# Patient Record
Sex: MALE | Race: BLACK OR AFRICAN AMERICAN | NOT HISPANIC OR LATINO | Employment: UNEMPLOYED | RURAL
[De-identification: names, ages, dates, MRNs, and addresses within clinical notes are randomized per-mention and may not be internally consistent; named-entity substitution may affect disease eponyms.]

---

## 2020-10-12 ENCOUNTER — HISTORICAL (OUTPATIENT)
Dept: ADMINISTRATIVE | Facility: HOSPITAL | Age: 40
End: 2020-10-12

## 2020-10-12 LAB
ANION GAP SERPL CALCULATED.3IONS-SCNC: 14 MMOL/L
APTT PPP: 30.7 SECONDS (ref 25.2–37.3)
BASOPHILS # BLD AUTO: 0.03 X10E3/UL (ref 0–0.2)
BASOPHILS NFR BLD AUTO: 0.4 % (ref 0–1)
BUN SERPL-MCNC: 15 MG/DL (ref 7–18)
CALCIUM SERPL-MCNC: 9.4 MG/DL (ref 8.5–10.1)
CHLORIDE SERPL-SCNC: 101 MMOL/L (ref 98–107)
CK MB SERPL-MCNC: 4.4 NG/ML (ref 1–3.6)
CO2 SERPL-SCNC: 27 MMOL/L (ref 21–32)
CREAT SERPL-MCNC: 1.06 MG/DL (ref 0.7–1.3)
EOSINOPHIL # BLD AUTO: 0.11 X10E3/UL (ref 0–0.5)
EOSINOPHIL NFR BLD AUTO: 1.5 % (ref 1–4)
ERYTHROCYTE [DISTWIDTH] IN BLOOD BY AUTOMATED COUNT: 13 % (ref 11.5–14.5)
GLUCOSE SERPL-MCNC: 99 MG/DL (ref 74–106)
HCT VFR BLD AUTO: 46.9 % (ref 40–54)
HGB BLD-MCNC: 15.9 G/DL (ref 13.5–18)
IMM GRANULOCYTES # BLD AUTO: 0.04 X10E3/UL (ref 0–0.04)
IMM GRANULOCYTES NFR BLD: 0.5 % (ref 0–0.4)
INR BLD: 1.04 (ref 0–3.3)
LYMPHOCYTES # BLD AUTO: 2.24 X10E3/UL (ref 1–4.8)
LYMPHOCYTES NFR BLD AUTO: 29.9 % (ref 27–41)
MAGNESIUM SERPL-MCNC: 2.1 MG/DL (ref 1.7–2.3)
MCH RBC QN AUTO: 28.5 PG (ref 27–31)
MCHC RBC AUTO-ENTMCNC: 33.9 G/DL (ref 32–36)
MCV RBC AUTO: 84.1 FL (ref 80–96)
MONOCYTES # BLD AUTO: 0.56 X10E3/UL (ref 0–0.8)
MONOCYTES NFR BLD AUTO: 7.5 % (ref 2–6)
MPC BLD CALC-MCNC: 9.9 FL (ref 9.4–12.4)
MYOGLOBIN SERPL-MCNC: 165 NG/ML (ref 16–116)
NEUTROPHILS # BLD AUTO: 4.52 X10E3/UL (ref 1.8–7.7)
NEUTROPHILS NFR BLD AUTO: 60.2 % (ref 53–65)
NRBC # BLD AUTO: 0 X10E3/UL (ref 0–0)
NRBC, AUTO (.00): 0 /100 (ref 0–0)
PLATELET # BLD AUTO: 297 X10E3/UL (ref 150–400)
POTASSIUM SERPL-SCNC: 4.2 MMOL/L (ref 3.5–5.1)
PROTHROMBIN TIME: 13.1 SECONDS (ref 11.7–14.7)
RBC # BLD AUTO: 5.58 X10E6/UL (ref 4.6–6.2)
SODIUM SERPL-SCNC: 138 MMOL/L (ref 136–145)
TROPONIN I SERPL-MCNC: 0.17 NG/ML (ref 0–0.06)
WBC # BLD AUTO: 7.5 X10E3/UL (ref 4.5–11)

## 2021-08-02 PROBLEM — R73.03 PREDIABETES: Status: ACTIVE | Noted: 2021-02-03

## 2021-08-31 ENCOUNTER — OFFICE VISIT (OUTPATIENT)
Dept: CARDIOLOGY | Facility: CLINIC | Age: 41
End: 2021-08-31
Payer: COMMERCIAL

## 2021-08-31 VITALS
DIASTOLIC BLOOD PRESSURE: 80 MMHG | HEIGHT: 69 IN | WEIGHT: 217 LBS | HEART RATE: 88 BPM | RESPIRATION RATE: 18 BRPM | BODY MASS INDEX: 32.14 KG/M2 | SYSTOLIC BLOOD PRESSURE: 115 MMHG

## 2021-08-31 DIAGNOSIS — I25.10 ATHEROSCLEROSIS OF NATIVE CORONARY ARTERY OF NATIVE HEART WITHOUT ANGINA PECTORIS: ICD-10-CM

## 2021-08-31 DIAGNOSIS — E78.00 HYPERCHOLESTEREMIA: ICD-10-CM

## 2021-08-31 PROCEDURE — 3074F PR MOST RECENT SYSTOLIC BLOOD PRESSURE < 130 MM HG: ICD-10-PCS | Mod: ,,, | Performed by: STUDENT IN AN ORGANIZED HEALTH CARE EDUCATION/TRAINING PROGRAM

## 2021-08-31 PROCEDURE — 1159F MED LIST DOCD IN RCRD: CPT | Mod: ,,, | Performed by: STUDENT IN AN ORGANIZED HEALTH CARE EDUCATION/TRAINING PROGRAM

## 2021-08-31 PROCEDURE — 99214 OFFICE O/P EST MOD 30 MIN: CPT | Mod: S$PBB,,, | Performed by: STUDENT IN AN ORGANIZED HEALTH CARE EDUCATION/TRAINING PROGRAM

## 2021-08-31 PROCEDURE — 3008F PR BODY MASS INDEX (BMI) DOCUMENTED: ICD-10-PCS | Mod: ,,, | Performed by: STUDENT IN AN ORGANIZED HEALTH CARE EDUCATION/TRAINING PROGRAM

## 2021-08-31 PROCEDURE — 3074F SYST BP LT 130 MM HG: CPT | Mod: ,,, | Performed by: STUDENT IN AN ORGANIZED HEALTH CARE EDUCATION/TRAINING PROGRAM

## 2021-08-31 PROCEDURE — 1159F PR MEDICATION LIST DOCUMENTED IN MEDICAL RECORD: ICD-10-PCS | Mod: ,,, | Performed by: STUDENT IN AN ORGANIZED HEALTH CARE EDUCATION/TRAINING PROGRAM

## 2021-08-31 PROCEDURE — 99214 PR OFFICE/OUTPT VISIT, EST, LEVL IV, 30-39 MIN: ICD-10-PCS | Mod: S$PBB,,, | Performed by: STUDENT IN AN ORGANIZED HEALTH CARE EDUCATION/TRAINING PROGRAM

## 2021-08-31 PROCEDURE — 3079F DIAST BP 80-89 MM HG: CPT | Mod: ,,, | Performed by: STUDENT IN AN ORGANIZED HEALTH CARE EDUCATION/TRAINING PROGRAM

## 2021-08-31 PROCEDURE — 3079F PR MOST RECENT DIASTOLIC BLOOD PRESSURE 80-89 MM HG: ICD-10-PCS | Mod: ,,, | Performed by: STUDENT IN AN ORGANIZED HEALTH CARE EDUCATION/TRAINING PROGRAM

## 2021-08-31 PROCEDURE — 3008F BODY MASS INDEX DOCD: CPT | Mod: ,,, | Performed by: STUDENT IN AN ORGANIZED HEALTH CARE EDUCATION/TRAINING PROGRAM

## 2021-08-31 PROCEDURE — 99213 OFFICE O/P EST LOW 20 MIN: CPT | Mod: PBBFAC | Performed by: STUDENT IN AN ORGANIZED HEALTH CARE EDUCATION/TRAINING PROGRAM

## 2021-08-31 RX ORDER — METOPROLOL TARTRATE 50 MG/1
TABLET ORAL
COMMUNITY
Start: 2021-08-26 | End: 2021-08-31 | Stop reason: SDUPTHER

## 2021-08-31 RX ORDER — MULTIVIT-MIN/IRON/FOLIC ACID/K 18-600-40
CAPSULE ORAL
COMMUNITY

## 2021-08-31 RX ORDER — BUPROPION HYDROCHLORIDE 300 MG/1
TABLET ORAL
COMMUNITY
Start: 2021-08-26 | End: 2021-08-31

## 2021-08-31 RX ORDER — ATORVASTATIN CALCIUM 40 MG/1
TABLET, FILM COATED ORAL
COMMUNITY
Start: 2021-08-26 | End: 2022-02-21

## 2022-04-20 ENCOUNTER — CLINICAL SUPPORT (OUTPATIENT)
Dept: PRIMARY CARE CLINIC | Facility: CLINIC | Age: 42
End: 2022-04-20

## 2022-04-20 DIAGNOSIS — Z02.89 ENCOUNTER FOR PHYSICAL EXAMINATION RELATED TO EMPLOYMENT: Primary | ICD-10-CM

## 2022-04-20 DIAGNOSIS — Z11.1 SCREENING-PULMONARY TB: ICD-10-CM

## 2022-04-20 LAB — RUBV IGG SER-ACNC: 393.9 IU/ML

## 2022-04-20 PROCEDURE — 99000 PR URINE DRUG SCREEN COLLECTION: ICD-10-PCS | Mod: ,,, | Performed by: NURSE PRACTITIONER

## 2022-04-20 PROCEDURE — 36415 COLL VENOUS BLD VENIPUNCTURE: CPT | Mod: ,,, | Performed by: NURSE PRACTITIONER

## 2022-04-20 PROCEDURE — 99499 UNLISTED E&M SERVICE: CPT | Mod: ,,, | Performed by: NURSE PRACTITIONER

## 2022-04-20 PROCEDURE — 86580 TB INTRADERMAL TEST: CPT | Mod: ,,, | Performed by: NURSE PRACTITIONER

## 2022-04-20 PROCEDURE — 86580 PR  TB INTRADERMAL TEST: ICD-10-PCS | Mod: ,,, | Performed by: NURSE PRACTITIONER

## 2022-04-20 PROCEDURE — 99000 SPECIMEN HANDLING OFFICE-LAB: CPT | Mod: ,,, | Performed by: NURSE PRACTITIONER

## 2022-04-20 PROCEDURE — 90471 IMMUNIZATION ADMIN: CPT | Mod: ,,, | Performed by: NURSE PRACTITIONER

## 2022-04-20 PROCEDURE — 90715 PR TDAP VACCINE >7 YO, IM: ICD-10-PCS | Mod: ,,, | Performed by: NURSE PRACTITIONER

## 2022-04-20 PROCEDURE — 99499 PR PHYSICAL - BASIC NON DOT/CDL: ICD-10-PCS | Mod: ,,, | Performed by: NURSE PRACTITIONER

## 2022-04-20 PROCEDURE — 36415 PR COLLECTION VENOUS BLOOD,VENIPUNCTURE: ICD-10-PCS | Mod: ,,, | Performed by: NURSE PRACTITIONER

## 2022-04-20 PROCEDURE — 90471 PR IMMUNIZ ADMIN,1 SINGLE/COMB VAC/TOXOID: ICD-10-PCS | Mod: ,,, | Performed by: NURSE PRACTITIONER

## 2022-04-20 PROCEDURE — 86706 HEP B SURFACE ANTIBODY: CPT | Mod: 90 | Performed by: NURSE PRACTITIONER

## 2022-04-20 PROCEDURE — 90715 TDAP VACCINE 7 YRS/> IM: CPT | Mod: ,,, | Performed by: NURSE PRACTITIONER

## 2022-04-20 NOTE — PROGRESS NOTES
Subjective:       Patient ID: Nieves Moody is a 41 y.o. male.    Chief Complaint: No chief complaint on file.    HPI  Review of Systems      Objective:      Physical Exam    Assessment:       Problem List Items Addressed This Visit    None     Visit Diagnoses     Encounter for physical examination related to employment    -  Primary    Relevant Orders    Hepatitis B Surface Antibody, Qual/Quant    Mumps, IgG Screen    Rubeola antibody IgG    Rubella antibody, IgG    Varicella Zoster Antibody, IgG    Tdap Vaccine (Completed)    Screening-pulmonary TB        Relevant Orders    POCT TB Skin Test (Completed)          Plan:       See scanned documents in .

## 2022-04-22 LAB
HBV SURFACE AB SER QL IA: POSITIVE
HBV SURFACE AB SERPL IA-ACNC: 1000 MIU/ML
TB INDURATION - 48 HR READ: NORMAL
TB INDURATION - 72 HR READ: NORMAL
TB SKIN TEST - 48 HR READ: NORMAL
TB SKIN TEST - 72 HR READ: NORMAL

## 2022-04-27 LAB
MEASLES IGG INDEX: >4.5
MEV IGG SER QL: POSITIVE
MUMPS AB IGG INDEX: >4.2
MUV IGG SER QL IA: POSITIVE
VARICELLA ZOSTER, BLOOD: POSITIVE
VZV IGG INDEX: 2.47 (ref 0–0.9)

## 2023-03-02 ENCOUNTER — OFFICE VISIT (OUTPATIENT)
Dept: FAMILY MEDICINE | Facility: CLINIC | Age: 43
End: 2023-03-02
Payer: COMMERCIAL

## 2023-03-02 VITALS
SYSTOLIC BLOOD PRESSURE: 124 MMHG | OXYGEN SATURATION: 98 % | BODY MASS INDEX: 33.5 KG/M2 | RESPIRATION RATE: 18 BRPM | DIASTOLIC BLOOD PRESSURE: 81 MMHG | HEART RATE: 78 BPM | TEMPERATURE: 98 F | WEIGHT: 226.19 LBS | HEIGHT: 69 IN

## 2023-03-02 DIAGNOSIS — M53.86 SCIATICA ASSOCIATED WITH DISORDER OF LUMBAR SPINE: Primary | ICD-10-CM

## 2023-03-02 PROCEDURE — 1160F RVW MEDS BY RX/DR IN RCRD: CPT | Mod: ,,, | Performed by: NURSE PRACTITIONER

## 2023-03-02 PROCEDURE — 1159F PR MEDICATION LIST DOCUMENTED IN MEDICAL RECORD: ICD-10-PCS | Mod: ,,, | Performed by: NURSE PRACTITIONER

## 2023-03-02 PROCEDURE — 3074F SYST BP LT 130 MM HG: CPT | Mod: ,,, | Performed by: NURSE PRACTITIONER

## 2023-03-02 PROCEDURE — 3079F PR MOST RECENT DIASTOLIC BLOOD PRESSURE 80-89 MM HG: ICD-10-PCS | Mod: ,,, | Performed by: NURSE PRACTITIONER

## 2023-03-02 PROCEDURE — 1160F PR REVIEW ALL MEDS BY PRESCRIBER/CLIN PHARMACIST DOCUMENTED: ICD-10-PCS | Mod: ,,, | Performed by: NURSE PRACTITIONER

## 2023-03-02 PROCEDURE — 96372 PR INJECTION,THERAP/PROPH/DIAG2ST, IM OR SUBCUT: ICD-10-PCS | Mod: ,,, | Performed by: NURSE PRACTITIONER

## 2023-03-02 PROCEDURE — 99203 PR OFFICE/OUTPT VISIT, NEW, LEVL III, 30-44 MIN: ICD-10-PCS | Mod: 25,,, | Performed by: NURSE PRACTITIONER

## 2023-03-02 PROCEDURE — 3079F DIAST BP 80-89 MM HG: CPT | Mod: ,,, | Performed by: NURSE PRACTITIONER

## 2023-03-02 PROCEDURE — 3008F BODY MASS INDEX DOCD: CPT | Mod: ,,, | Performed by: NURSE PRACTITIONER

## 2023-03-02 PROCEDURE — 96372 THER/PROPH/DIAG INJ SC/IM: CPT | Mod: ,,, | Performed by: NURSE PRACTITIONER

## 2023-03-02 PROCEDURE — 3008F PR BODY MASS INDEX (BMI) DOCUMENTED: ICD-10-PCS | Mod: ,,, | Performed by: NURSE PRACTITIONER

## 2023-03-02 PROCEDURE — 99203 OFFICE O/P NEW LOW 30 MIN: CPT | Mod: 25,,, | Performed by: NURSE PRACTITIONER

## 2023-03-02 PROCEDURE — 1159F MED LIST DOCD IN RCRD: CPT | Mod: ,,, | Performed by: NURSE PRACTITIONER

## 2023-03-02 PROCEDURE — 3074F PR MOST RECENT SYSTOLIC BLOOD PRESSURE < 130 MM HG: ICD-10-PCS | Mod: ,,, | Performed by: NURSE PRACTITIONER

## 2023-03-02 RX ORDER — KETOROLAC TROMETHAMINE 30 MG/ML
30 INJECTION, SOLUTION INTRAMUSCULAR; INTRAVENOUS
Status: COMPLETED | OUTPATIENT
Start: 2023-03-02 | End: 2023-03-02

## 2023-03-02 RX ORDER — METHOCARBAMOL 750 MG/1
TABLET, FILM COATED ORAL
COMMUNITY
Start: 2023-02-27 | End: 2023-03-06

## 2023-03-02 RX ORDER — KETOROLAC TROMETHAMINE 30 MG/ML
30 INJECTION, SOLUTION INTRAMUSCULAR; INTRAVENOUS
Status: DISCONTINUED | OUTPATIENT
Start: 2023-03-02 | End: 2023-03-02

## 2023-03-02 RX ORDER — ATORVASTATIN CALCIUM 40 MG/1
TABLET, FILM COATED ORAL
COMMUNITY
Start: 2022-09-22 | End: 2023-03-20

## 2023-03-02 RX ORDER — PREDNISONE 20 MG/1
TABLET ORAL
Qty: 23 TABLET | Refills: 0 | Status: SHIPPED | OUTPATIENT
Start: 2023-03-02 | End: 2023-03-15

## 2023-03-02 RX ORDER — PREDNISONE 20 MG/1
20 TABLET ORAL
COMMUNITY
Start: 2023-02-27 | End: 2023-03-02

## 2023-03-02 RX ORDER — GABAPENTIN 100 MG/1
CAPSULE ORAL
COMMUNITY
Start: 2023-03-01

## 2023-03-02 RX ADMIN — KETOROLAC TROMETHAMINE 30 MG: 30 INJECTION, SOLUTION INTRAMUSCULAR; INTRAVENOUS at 10:03

## 2023-03-02 NOTE — PROGRESS NOTES
RAJAT Nobles   [facility]      PATIENT NAME: Nieves Moody  : 1980  DATE: 3/2/23  MRN: [id number]      Patient PCP Information       Provider PCP Type    Linda Vazquez MD General            Reason for Visit / Chief Complaint: Pain (Room 1///)           History of Present Illness / Problem Focused Workflow     Nieves Moody presents to the clinic with Pain (Room 1///)     HPI  Nieves Moody presents to clinic with concern for lower back pain and sciatica. Worse on left.   Patient was seen at urgent care clinic several days ago prescribed low dose prednisone and given steroid shot. Patient has also taken muscle relaxer and gabapentin at home with no improvement.   Gait abnormality and impaired ROM secondary to pain. Patient reports improved pain with knee to chest raise when lying flat on back. Worse when transferring from lying to standing position.   Patient reports intermittent issues with back pain in last several months. Does exercise regularly and lift weights. No prior imaging. Did reports some immediate improvement after prev steroid injection however later wore off.   Prior hx of CABG single vessel, graft from right lower ext, prediabetes and hyperlipidemia      Review of Systems     Review of Systems   Constitutional:  Negative for activity change, appetite change, chills, diaphoresis, fatigue, fever and unexpected weight change.   HENT:  Negative for congestion, ear pain, facial swelling, hearing loss, nosebleeds and sore throat.    Eyes: Negative.    Respiratory:  Negative for apnea, cough, shortness of breath and wheezing.    Cardiovascular:  Negative for chest pain, palpitations and leg swelling.   Gastrointestinal:  Negative for abdominal distention, abdominal pain, blood in stool, constipation, diarrhea and nausea.   Endocrine: Negative for cold intolerance, heat intolerance, polydipsia, polyphagia and polyuria.   Genitourinary:  Negative for decreased urine volume,  difficulty urinating, dysuria, flank pain, frequency, hematuria and urgency.   Musculoskeletal:  Positive for back pain. Negative for arthralgias, joint swelling and myalgias.   Skin:  Negative for color change and rash.   Allergic/Immunologic: Negative.    Neurological:  Negative for dizziness, tremors, seizures, syncope, facial asymmetry, speech difficulty, weakness, light-headedness, numbness and headaches.   Hematological:  Negative for adenopathy. Does not bruise/bleed easily.   Psychiatric/Behavioral:  Negative for behavioral problems and confusion.      Medical / Social / Family History     Past Medical History:   Diagnosis Date    CAD (coronary atherosclerotic disease) 10/12/2020    Hypercholesteremia     Hyperlipidemia     Nicotine dependence, cigarettes, uncomplicated     Prediabetes 02/03/2021    A1c 6.0% 2/3/21 w/o prior hx of predm/dm    Presence of aortocoronary bypass graft     STEMI (ST elevation myocardial infarction) 10/12/2020    tsf from [facility] to [facility] for CABG (SVG to LAD) on 10/13/20 per Dr. Montejo       Past Surgical History:   Procedure Laterality Date    CORONARY ARTERY BYPASS GRAFT  10/13/2020    per Dr. Montejo at [facility]       Social History    reports that he has been smoking cigarettes. He has been smoking an average of 1 pack per day. He has never used smokeless tobacco. He reports current alcohol use. He reports that he does not use drugs.    Family History  's family history includes Diabetes in his mother.    Medications and Allergies     Medications  Outpatient Medications Marked as Taking for the 3/2/23 encounter (Office Visit) with RAJAT Nobles   Medication Sig Dispense Refill    ascorbic acid, vitamin C, 500 mg Cap       atorvastatin (LIPITOR) 40 MG tablet take 2 tablets by oral route  every hs      buPROPion (WELLBUTRIN SR) 150 MG TBSR 12 hr tablet Take 150 mg by mouth 2 (two) times daily.      clopidogreL (PLAVIX) 75 mg tablet Take 75 mg by mouth once daily.    "   gabapentin (NEURONTIN) 100 MG capsule Take by mouth.      methocarbamoL (ROBAXIN) 750 MG Tab Take by mouth.      metoprolol succinate (TOPROL-XL) 50 MG 24 hr tablet TAKE 1 TABLET BY MOUTH EVERY DAY 90 tablet 1    [DISCONTINUED] predniSONE (DELTASONE) 20 MG tablet Take 20 mg by mouth.       Current Facility-Administered Medications for the 3/2/23 encounter (Office Visit) with RAJAT Nobles   Medication Dose Route Frequency Provider Last Rate Last Admin    [COMPLETED] ketorolac injection 30 mg  30 mg Intramuscular 1 time in Clinic/HOD RAJAT Nobles   30 mg at 03/02/23 1035    [DISCONTINUED] ketorolac injection 30 mg  30 mg Intramuscular 1 time in Clinic/HOD RAJAT Nobles           Allergies  Review of patient's allergies indicates:   Allergen Reactions    Sulfa (sulfonamide antibiotics)        Physical Examination     Vitals:    03/02/23 0833   BP: 124/81   BP Location: Left arm   Patient Position: Sitting   Pulse: 78   Resp: 18   Temp: 98.1 °F (36.7 °C)   SpO2: 98%   Weight: 102.6 kg (226 lb 3 oz)   Height: 5' 9" (1.753 m)       Physical Exam  Vitals and nursing note reviewed.   Constitutional:       Appearance: Normal appearance. He is obese.   HENT:      Head: Normocephalic.      Nose: Nose normal.      Mouth/Throat:      Mouth: Mucous membranes are moist.   Eyes:      Extraocular Movements: Extraocular movements intact.      Conjunctiva/sclera: Conjunctivae normal.      Pupils: Pupils are equal, round, and reactive to light.   Cardiovascular:      Rate and Rhythm: Normal rate.      Pulses: Normal pulses.      Heart sounds: Normal heart sounds. No murmur heard.  Pulmonary:      Effort: Pulmonary effort is normal.      Breath sounds: Normal breath sounds. No stridor. No wheezing or rhonchi.   Abdominal:      General: Bowel sounds are normal. There is no distension.      Palpations: Abdomen is soft. There is no mass.      Tenderness: There is no abdominal tenderness.   Musculoskeletal:    "      General: No swelling or tenderness. Normal range of motion.      Cervical back: Normal range of motion and neck supple.      Right lower leg: No edema.      Left lower leg: No edema.      Comments: Low back pain and gait abnormality, guarding left lower back on ambulation  Pain improves when lying and flat and stretching left lower ext to chest  Pain worse when transferring from lying to standing   Skin:     General: Skin is warm and dry.      Capillary Refill: Capillary refill takes less than 2 seconds.   Neurological:      General: No focal deficit present.      Mental Status: He is alert and oriented to person, place, and time. Mental status is at baseline.      Cranial Nerves: No cranial nerve deficit.      Sensory: No sensory deficit.      Motor: No weakness.   Psychiatric:         Mood and Affect: Mood normal.         Behavior: Behavior normal.         Thought Content: Thought content normal.         Judgment: Judgment normal.         No visits with results within 14 Day(s) from this visit.   Latest known visit with results is:   Clinical Support on 04/20/2022   Component Date Value Ref Range Status    HBs Antibody 04/20/2022 Positive   Final    Patient is considered to be immune to infection with HBV.     -------------------REFERENCE VALUE--------------------------  Unvaccinated: Negative   Vaccinated: Positive     HBs Antibody, Quantitative 04/20/2022 1000  mIU/mL Final       -------------------REFERENCE VALUE--------------------------  Unvaccinated: <5.0   Vaccinated: >=12.0      Test Performed by:  [facility]  [address] [place] [address]  : Blane Dale M.D. Ph.D.; CLIA# [id number]    Mumps Ab IgG 04/20/2022 Positive   Final    Mumps Ab IgG Index 04/20/2022 >4.2   Final    Mumps IgG Index Interpretation:    <0.9:  Negative  0.9-1.1: Equivocal  >1.1:  Positive (Presumed Immune)    Measles IgG Index 04/20/2022 >4.5   Final     Measles IgG Index Interpretation:    <0.9:  Negative  0.9 - 1.1: Equivocal  >1.1:  Positive (Presumed Immune)    Measles IgG 04/20/2022 Positive   Final    Rubella Titer, Blood 04/20/2022 393.9  IU/mL Final      <5:              Non-Immune  5.0-9.9:       Equivocal  >=10:          Immune    Varicella Zoster 04/20/2022 Positive (A)  Negative, See Ref Lab Report Final    VZV IgG Index 04/20/2022 2.473 (H)  0.000 - 0.899 Final    Varicella Zoster IgG Index Interpretation:    <0.9:  Negative  0.9-1.1: Equivocal  >1.1:  Positive (Presumed Immune)             Assessment and Plan (including Health Maintenance)         Plan:   Sciatica associated with disorder of lumbar spine  -     X-Ray Lumbar Spine AP And Lateral; Future; Expected date: 03/02/2023  -     predniSONE (DELTASONE) 20 MG tablet; Take 3 tablets (60 mg total) by mouth once daily for 5 days, THEN 2 tablets (40 mg total) once daily for 2 days, THEN 1 tablet (20 mg total) once daily for 2 days, THEN 1 tablet (20 mg total) every 48 hours for 4 days.  Dispense: 23 tablet; Refill: 0  -     ketorolac injection 30 mg        -    sciatica stretches, ice pack    Follow up prn      There are no Patient Instructions on file for this visit.       Health Maintenance Due   Topic Date Due    Hepatitis C Screening  Never done    Hemoglobin A1c (Prediabetes)  Never done    Pneumococcal Vaccines (Age 0-64) (1 - PCV) Never done    HIV Screening  Never done    COVID-19 Vaccine (2 - Moderna series) 03/20/2021    Influenza Vaccine (1) Never done       Most Recent Immunizations   Administered Date(s) Administered    COVID-19, MRNA, LN-S, PF (MODERNA FULL 0.5 ML DOSE) 02/20/2021    PPD Test 04/20/2022    Tdap 04/20/2022        Problem List Items Addressed This Visit    None  Visit Diagnoses       Sciatica associated with disorder of lumbar spine    -  Primary    Relevant Medications    predniSONE (DELTASONE) 20 MG tablet    ketorolac injection 30 mg (Completed)    Other Relevant Orders     X-Ray Lumbar Spine AP And Lateral (Completed)            Health Maintenance Topics with due status: Not Due       Topic Last Completion Date    Lipid Panel 02/03/2021    TETANUS VACCINE 04/20/2022    High Dose Statin 03/02/2023       No future appointments.         Signature:  RAJAT Nobles  [facility]     Date of encounter: 3/2/23

## 2023-03-06 ENCOUNTER — TELEPHONE (OUTPATIENT)
Dept: FAMILY MEDICINE | Facility: CLINIC | Age: 43
End: 2023-03-06
Payer: COMMERCIAL

## 2023-03-06 ENCOUNTER — DOCUMENTATION ONLY (OUTPATIENT)
Dept: FAMILY MEDICINE | Facility: CLINIC | Age: 43
End: 2023-03-06
Payer: COMMERCIAL

## 2023-03-06 DIAGNOSIS — M53.86 SCIATICA ASSOCIATED WITH DISORDER OF LUMBAR SPINE: Primary | ICD-10-CM

## 2023-03-06 RX ORDER — CYCLOBENZAPRINE HCL 10 MG
10 TABLET ORAL NIGHTLY
Qty: 5 TABLET | Refills: 0 | Status: SHIPPED | OUTPATIENT
Start: 2023-03-06 | End: 2023-03-11

## 2023-03-06 NOTE — TELEPHONE ENCOUNTER
----- Message from Dhruv Ramires sent at 3/6/2023  7:59 AM CST -----  Wants a ref neurology to [facility] pt num [phone]

## 2023-03-22 ENCOUNTER — CLINICAL SUPPORT (OUTPATIENT)
Dept: REHABILITATION | Facility: HOSPITAL | Age: 43
End: 2023-03-22
Payer: COMMERCIAL

## 2023-03-22 DIAGNOSIS — G89.29 CHRONIC BILATERAL LOW BACK PAIN WITH BILATERAL SCIATICA: ICD-10-CM

## 2023-03-22 DIAGNOSIS — M53.86 SCIATICA ASSOCIATED WITH DISORDER OF LUMBAR SPINE: ICD-10-CM

## 2023-03-22 DIAGNOSIS — M54.42 CHRONIC BILATERAL LOW BACK PAIN WITH BILATERAL SCIATICA: ICD-10-CM

## 2023-03-22 DIAGNOSIS — M54.41 CHRONIC BILATERAL LOW BACK PAIN WITH BILATERAL SCIATICA: ICD-10-CM

## 2023-03-22 NOTE — PLAN OF CARE
[facility]  Physical Therapy Initial Evaluation    Name: Pamella Moody  Clinic Number: [id number]    Encounter Diagnoses   Name Primary?    Sciatica associated with disorder of lumbar spine     Chronic bilateral low back pain with bilateral sciatica       Physician: Raven Gardiner FNP    Physician Orders: PT Eval and Treat  Medical Diagnosis from Referral: Sciatica associated with disorder of lumbar spine [M53.86]  Evaluation Date: 3/22/2023  Authorization Period Expiration: 3/5/24  Plan of Care Expiration: 5/3/2023     Visit # / Visits authorized: 1 / 1 (EVAL)    FOTO: Visit #1 - Scored : 1 / 3     PRECAUTIONS: Standard Precautions       Time In: 1:00 PM  Time Out: 1:50 PM  Total Appointment Time (timed & untimed codes): 50 minutes    SUBJECTIVE     Date of onset: ~ around november 2022    History of current condition - PAMELLA is a 42 y.o. male whom reports sciatic nerve pain; this morning It has flared up today and over the past couple weeks it has been bother some . Patient has left sided back pain with radiation down the left toes .  Patient thinks it was at the gym and had the injury while doing core exercises. PAMELLA's current exercise regiment includes: none because of pain.  Seeking Physical Therapy for reducing back pain.    GAY: Gym injury  Falls: None  Physician Instructions (per patient): none  Other concerns: none    Imaging: X-RAY:     FINDINGS:  There is straightening of normal lumbar lordosis which may be positional or secondary to muscle spasm. There is no significant anterolisthesis or retrolisthesis.  Moderate loss of disc space height at L3-4.  More mild loss of disc space height at other levels.  Lumbar vertebral body heights appear maintained.       Prior Therapy: N/A  Social History: Pt lives with their family    Occupation: Pt is respiratory therapist   Prior Level of Function: Independent with all ADLs  Current Level of Function:   - Sitting Tolerance: > hour    - Standing Tolerance: 10 min  - Walking Tolerance: 10 min  - Stair Tolerance: N/A flights    Pain:  Current 0 /10, worst 10 /10, best 2 /10   Location: Left Lower extremity  Description: Aching, Burning, Deep, Numb, and Sharp  Aggravating Factors: standing erect,   Easing Factors: none    Pts goals: Pt reported goals are reduce pain     _______________________________________________________  Medical History:   Past Medical History:   Diagnosis Date    CAD (coronary atherosclerotic disease) 10/12/2020    Hypercholesteremia     Hyperlipidemia     Nicotine dependence, cigarettes, uncomplicated     Prediabetes 02/03/2021    A1c 6.0% 2/3/21 w/o prior hx of predm/dm    Presence of aortocoronary bypass graft     STEMI (ST elevation myocardial infarction) 10/12/2020    tsf from [facility] to [facility] for CABG (SVG to LAD) on 10/13/20 per Dr. Montejo       Surgical History:   Nieves Moody  has a past surgical history that includes Coronary artery bypass graft (10/13/2020).    Medications:   Nieves has a current medication list which includes the following prescription(s): ascorbic acid (vitamin c), atorvastatin, bupropion, clopidogrel, gabapentin, and metoprolol succinate.    Allergies:   Review of patient's allergies indicates:   Allergen Reactions    Sulfa (sulfonamide antibiotics)         OBJECTIVE     RANGE OF MOTION:    Lumbar AROM/PROM Right  (spine) Left   Goal   Lumbar Flexion (60) 40  60   Lumbar Extension (30) 15  30   Lumbar Side Bending (25) functional functional 25   Lumbar Rotation functional functional Pain Free     Hip AROM/PROM Right   Left   Goal   Hip IR (45) 45 30 40   Hip ER (45) 45 30 40       NEURO SCREEN:    Neuro Testing Right   Left   Details   Reflexes  Not tested  Not tested     Dermatomes normal normal    Myotomes normal normal    Tone normal normal    Spasticity Not present Not present        FUNCTIONAL SCREEN:    Upper Extremity ROM Details STRENGTH Details   Shoulder functional No pain or  discomfort Grossly- 5/5 No pain or discomfort   Elbow functional No Pain or discomfort Grossly- 5/5 No pain or discomfort   Hand/Wrist Functional No pain or discomfort Grossly- 5/5 No pain or discomfort     Lower Extremity STRENGTH Details   Hip Grossly 4/5 No pain or discomfort   Knee Grossly 4/5 No pain or discomfort   Foot/Ankle Grossly 4/5 No pain or discomfort     Abdominal Strength STRENGTH Details   Pelvic Tilt -    Double Leg Drop -    Plank Not tested           SPECIAL TESTS:     Right  (spine)   Left    Goal   Slump Positive Positive Negative B    SLR Negative Negative Negative B       Negative B       Negative B       Negative B        Sensation:  Sensation is intact to light touch    Palpation: Increased tone and tenderness noted with palpation to:  lumbar paraspinals , quadratus lumborum , glutes, and hip flexors    Posture:  Pt presents with postural abnormalities which include:     Lower Quarter (s): Lumbar Lordosis    Gait Analysis: The patient ambulated with the following assistive device: none; the pt presents with the following gait abnormalities: decreased step length, amanda, and arm swing; increased forward flexed posture, trunk sway -     FUNCTION:     CMS Impairment/Limitation/Restriction for FOTO Lumbar spine Survey    Therapist reviewed FOTO scores for Pamella Moody on 3/22/2023.   FOTO documents entered into [employer] - see Media section.    Limitation Score: 43%         TREATMENT     Total Treatment time separate from Evaluation:     PAMELLA received the treatments listed below:      THERAPEUTIC EXERCISES: to develop strength, endurance, ROM, flexibility, posture and core stabilization for  minutes including: x = performed today    TherEx 3/22/2023    Posterior Pelvic Tilt     sktc     Supine March with trA brace     Supine lumbar rot      BOLD = new this visit  Plan for Next Visit:        PATIENT EDUCATION AND HOME EXERCISES     Education/Self-Care provided:  (included in treatment) minutes    Patient educated on the impairments noted above and the effects of physical therapy intervention to improve overall condition and QOL.   Patient was educated on all the above exercise prior/during/after for proper posture, positioning, and execution for safe performance with home exercise program.       Written Home Exercises Provided: yes. Prefers: Printed Copies  Exercises were reviewed and PAMELLA was able to demonstrate them prior to the end of the session.  PAMELLA demonstrated good understanding of the education provided. See EMR under Patient Instructions for exercises provided during therapy sessions.    ASSESSMENT     Pamella is a 42 y.o. male referred to outpatient Physical Therapy with a medical diagnosis of Sciatica associated with disorder of lumbar spine .  Pamella presents with clinical signs and symptoms that support this diagnosis with decreased lumbar ROM, decreased lower extremity strength (4+),  lower extremity neural tension, and impaired functional mobility. Radicular symptoms are present from the lumbar spine down into the forefoot of his left lower extremity.    The above impairments will be addressed through manual therapy techniques, therapeutic exercises, functional training, and modalities as necessary. Patient was treated and educated on exercises for home program, progression of therapy, and benefits of therapy to achieve full functional mobility. Pt prognosis is Good.   Pt will benefit from skilled outpatient Physical Therapy to address the deficits stated above and in the chart below, provide pt/family education, and to maximize pt's level of independence.     Plan of care discussed with patient: Yes  Pt's spiritual, cultural and educational needs considered and patient is agreeable to the plan of care and goals as stated below:     Anticipated Barriers for therapy: co-morbidities and chronicity of condition    Clinical Presentation evolving clinical presentation with changing clinical  characteristics moderate   Decision Making/ Complexity Score: moderate       GOALS:  SHORT TERM GOALS: 3 weeks,  Progress   Recent signs and systems trend is improving in order to progress towards LTG's.    Patient will be independent with HEP in order to further progress and return to maximal function.    Pain rating at Worst: 5/10 in order to progress towards increased independence with activity.    Patient will be able to correct postural deviations in sitting and standing, to decrease pain and promote postural awareness for injury prevention.       LONG TERM GOALS: 6 weeks,  Progress   Patient will return to normal ADL, recreational, and work related activities with less pain and limitation.     Patient will improve AROM to stated goals in order to return to maximal functional potential.     Patient will improve Strength to stated goals of appropriate musculature in order to improve functional independence.     Pain Rating at Best: 1/10 to improve Quality of Life.     Patient will meet predicted functional outcome (FOTO) score:  points to increase self-worth & perceived functional ability.      PLAN   Plan of care Certification: 3/22/2023 to 5/3/2023    Outpatient Physical Therapy 1-2 times weekly for 6 weeks to include any combination of the following interventions: virtual visits, dry needling, modalities, electrical stimulation (IFC, Pre-Mod, Attended with Functional Dry Needling), Cervical/Lumbar Traction, Electrical Stimulation  , Gait Training, Manual Therapy, Moist Heat/ Ice, Neuromuscular Re-ed, Patient Education, Self Care, Therapeutic Activities, Therapeutic Exercise, Ultrasound, Functional Training, and Therapeutic Activites     Thank you for this referral.    Debra Robison, PT      I CERTIFY THE NEED FOR THESE SERVICES FURNISHED UNDER THIS PLAN OF TREATMENT AND WHILE UNDER MY CARE   Physician's comments:     Physician's Signature: ___________________________________________________

## 2023-03-29 ENCOUNTER — CLINICAL SUPPORT (OUTPATIENT)
Dept: REHABILITATION | Facility: HOSPITAL | Age: 43
End: 2023-03-29
Payer: COMMERCIAL

## 2023-03-29 DIAGNOSIS — M54.42 CHRONIC BILATERAL LOW BACK PAIN WITH BILATERAL SCIATICA: Primary | ICD-10-CM

## 2023-03-29 DIAGNOSIS — G89.29 CHRONIC BILATERAL LOW BACK PAIN WITH BILATERAL SCIATICA: Primary | ICD-10-CM

## 2023-03-29 DIAGNOSIS — M54.41 CHRONIC BILATERAL LOW BACK PAIN WITH BILATERAL SCIATICA: Primary | ICD-10-CM

## 2023-03-29 PROCEDURE — 97012 MECHANICAL TRACTION THERAPY: CPT | Mod: CQ

## 2023-03-29 PROCEDURE — 97140 MANUAL THERAPY 1/> REGIONS: CPT | Mod: CQ

## 2023-03-29 PROCEDURE — 97110 THERAPEUTIC EXERCISES: CPT | Mod: CQ

## 2023-03-29 NOTE — PROGRESS NOTES
[facility]   Physical Therapy Treatment Note     Name: Pamella Moody  Clinic Number: [id number]    Therapy Diagnosis:   Encounter Diagnosis   Name Primary?    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  He was compliant with home exercise program.  Response to previous treatment: first treatment after evaluation   Functional change: none     Pain: 7/10  Location: left back  and upper legs     OBJECTIVE     Objective Measures updated at progress report unless specified.     Treatment     PAMELLA received the treatments listed below:      therapeutic exercises to develop strength and core stabilization for 30 minutes including:  Slant board 4 x 15 seconds  Hamstring stretch 4 x 15 seconds  Ball rolls 5 x 5 seconds  Cybex back extension x 10   Lower trunk rotation 2 x 10     manual therapy techniques: Joint mobilizations, Manual traction, and Myofacial release were applied to the: lumbar for 10 minutes, including:  Piriformis stretch 4 x 15 seconds  Sktc 4 x 15 seconds    neuromuscular re-education activities to improve: Balance, Coordination, and Posture for - minutes. The following activities were included:      supervised modalities after being cleared for contradictions: TENS:  Pamella received TENS electrical stimulation for pain to the lower back.  Pt received continuous mode for - minutes. Pamella tolerated treatment well without any adverse effects.     Mechanical Traction:  Pamella received intermittent mechanical traction to the lumbar spine at a force of 85 pounds for a total of 15 minutes. Hold time of 60 seconds and rest time for 20 seconds. Patient tolerated treatment well without any adverse effects.        Patient Education and Home Exercises     Home Exercises Provided and Patient Education Provided     Education provided:   - encouraged to continue home exercise program     Written Home Exercises Provided: Patient instructed to cont prior HEP. Exercises were reviewed and PAMELLA was able to demonstrate them prior to the end of the session.  PAMELLA demonstrated good  understanding of the education provided. See EMR under Patient Instructions for exercises provided during therapy sessions    ASSESSMENT     Case conference with Debra Robison DPT. Patient was informed on correct body mechanics to perform exercises correctly. Patient had discomfort with most exercises but was able to push through them. Patient performed exercises slowly with precaution due to pain. Ended session with traction for radicular pain in left leg.    PAMELLA Is progressing towards his goals.   Pt prognosis is Good.     Pt will continue to benefit from skilled outpatient physical therapy to address the deficits listed in the problem list box on initial evaluation, provide pt/family education and to maximize pt's level of independence in the home and community environment.     Pt's spiritual, cultural and educational needs considered and pt agreeable to plan of care and goals.     Anticipated barriers to physical therapy: none     Goals:   SHORT TERM GOALS: 3 weeks,  Progress   Recent signs and systems trend is improving in order to progress towards LTG's.     Patient will be independent with HEP in order to further progress and return to maximal function.     Pain rating at Worst:  5/10 in order to progress towards increased independence with activity.     Patient will be able to correct postural deviations in sitting and standing, to decrease pain and promote postural awareness for injury prevention.         LONG TERM GOALS: 6 weeks,  Progress   Patient will return to normal ADL, recreational, and work related activities with less pain and limitation.      Patient will improve AROM to stated goals in order to return to maximal functional potential.      Patient will improve Strength to stated goals of appropriate musculature in order to improve functional independence.      Pain Rating at Best: 1/10 to improve Quality of Life.      Patient will meet predicted functional outcome (FOTO) score:  points to increase self-worth & perceived functional ability.        PLAN     Plan of care Certification: 3/22/2023 to 5/3/2023     Outpatient Physical Therapy 1-2 times weekly for 6 weeks to include any combination of the following interventions: virtual visits, dry needling, modalities, electrical stimulation (IFC, Pre-Mod, Attended with Functional Dry Needling), Cervical/Lumbar Traction, Electrical Stimulation  , Gait Training, Manual Therapy, Moist Heat/ Ice, Neuromuscular Re-ed, Patient Education, Self Care, Therapeutic Activities, Therapeutic Exercise, Ultrasound, Functional Training, and Therapeutic Activites        Bhavna Triplett PTA   03/29/2023

## 2023-04-05 ENCOUNTER — CLINICAL SUPPORT (OUTPATIENT)
Dept: REHABILITATION | Facility: HOSPITAL | Age: 43
End: 2023-04-05

## 2023-04-05 DIAGNOSIS — M54.41 CHRONIC BILATERAL LOW BACK PAIN WITH BILATERAL SCIATICA: Primary | ICD-10-CM

## 2023-04-05 DIAGNOSIS — G89.29 CHRONIC BILATERAL LOW BACK PAIN WITH BILATERAL SCIATICA: Primary | ICD-10-CM

## 2023-04-05 DIAGNOSIS — M54.42 CHRONIC BILATERAL LOW BACK PAIN WITH BILATERAL SCIATICA: Primary | ICD-10-CM

## 2023-04-05 PROCEDURE — 97012 MECHANICAL TRACTION THERAPY: CPT | Mod: CQ

## 2023-04-05 PROCEDURE — 97110 THERAPEUTIC EXERCISES: CPT | Mod: CQ

## 2023-04-05 NOTE — PROGRESS NOTES
[facility]   Physical Therapy Treatment Note     Name: Pamella Moody  Clinic Number: [id number]    Therapy Diagnosis:   Encounter Diagnosis   Name Primary?    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  He was compliant with home exercise program.  Response to previous treatment: first treatment after evaluation   Functional change: none     Pain: 7/10  Location: left back  and upper legs     OBJECTIVE     Objective Measures updated at progress report unless specified.     Treatment     PAMELLA received the treatments listed below:      therapeutic exercises to develop strength and core stabilization for 30 minutes including:  Slant board 4 x 15 seconds  Hamstring stretch 4 x 15 seconds  Ball rolls 5 x 5 seconds  Cybex back extension x 10   Lower trunk rotation 2 x 10     manual therapy techniques: Joint mobilizations, Manual traction, and Myofacial release were applied to the: lumbar for 0 minutes, including:  Piriformis stretch 4 x 15 seconds  Sktc 4 x 15 seconds    neuromuscular re-education activities to improve: Balance, Coordination, and Posture for - minutes. The following activities were included:      supervised modalities after being cleared for contradictions: TENS:  Pamella received TENS electrical stimulation for pain to the lower back. Pt  received continuous mode for - minutes. Pamella tolerated treatment well without any adverse effects.     Mechanical Traction:  Pamella received intermittent mechanical traction to the lumbar spine at a force of 85 pounds for a total of 15 minutes. Hold time of 60 seconds and rest time for 20 seconds. Patient tolerated treatment well without any adverse effects.        Patient Education and Home Exercises     Home Exercises Provided and Patient Education Provided     Education provided:   - encouraged to continue home exercise program     Written Home Exercises Provided: Patient instructed to cont prior HEP. Exercises were reviewed and PAMELLA was able to demonstrate them prior to the end of the session.  PAMELLA demonstrated good  understanding of the education provided. See EMR under Patient Instructions for exercises provided during therapy sessions    ASSESSMENT     Patient was informed on correct body mechanics to perform exercises correctly. Patient had discomfort with most exercises but was able to push through them. Patient has MRI scheduled for today. Ended session with traction for radicular pain in left leg.    PAMELLA Is progressing towards his goals.   Pt prognosis is Good.     Pt will continue to benefit from skilled outpatient physical therapy to address the deficits listed in the problem list box on initial evaluation, provide pt/family education and to maximize pt's level of independence in the home and community environment.     Pt's spiritual, cultural and educational needs considered and pt agreeable to plan of care and goals.     Anticipated barriers to physical therapy: none     Goals:   SHORT TERM GOALS: 3 weeks,  Progress   Recent signs and systems trend is improving in order to progress towards LTG's.     Patient will be independent with HEP in order to further progress and return to maximal function.     Pain rating at Worst: 5/10 in order to progress towards increased independence with  activity.     Patient will be able to correct postural deviations in sitting and standing, to decrease pain and promote postural awareness for injury prevention.         LONG TERM GOALS: 6 weeks,  Progress   Patient will return to normal ADL, recreational, and work related activities with less pain and limitation.      Patient will improve AROM to stated goals in order to return to maximal functional potential.      Patient will improve Strength to stated goals of appropriate musculature in order to improve functional independence.      Pain Rating at Best: 1/10 to improve Quality of Life.      Patient will meet predicted functional outcome (FOTO) score:  points to increase self-worth & perceived functional ability.        PLAN     Plan of care Certification: 3/22/2023 to 5/3/2023     Outpatient Physical Therapy 1-2 times weekly for 6 weeks to include any combination of the following interventions: virtual visits, dry needling, modalities, electrical stimulation (IFC, Pre-Mod, Attended with Functional Dry Needling), Cervical/Lumbar Traction, Electrical Stimulation  , Gait Training, Manual Therapy, Moist Heat/ Ice, Neuromuscular Re-ed, Patient Education, Self Care, Therapeutic Activities, Therapeutic Exercise, Ultrasound, Functional Training, and Therapeutic Activites        Bhavna Triplett PTA   04/05/2023

## 2023-04-12 ENCOUNTER — CLINICAL SUPPORT (OUTPATIENT)
Dept: REHABILITATION | Facility: HOSPITAL | Age: 43
End: 2023-04-12

## 2023-04-12 DIAGNOSIS — M54.42 CHRONIC BILATERAL LOW BACK PAIN WITH BILATERAL SCIATICA: Primary | ICD-10-CM

## 2023-04-12 DIAGNOSIS — G89.29 CHRONIC BILATERAL LOW BACK PAIN WITH BILATERAL SCIATICA: Primary | ICD-10-CM

## 2023-04-12 DIAGNOSIS — M54.41 CHRONIC BILATERAL LOW BACK PAIN WITH BILATERAL SCIATICA: Primary | ICD-10-CM

## 2023-04-12 PROCEDURE — 97012 MECHANICAL TRACTION THERAPY: CPT | Mod: CQ

## 2023-04-12 PROCEDURE — 97110 THERAPEUTIC EXERCISES: CPT | Mod: CQ

## 2023-04-12 PROCEDURE — 97140 MANUAL THERAPY 1/> REGIONS: CPT | Mod: CQ

## 2023-04-12 NOTE — PROGRESS NOTES
[facility]   Physical Therapy Treatment Note     Name: Pamella Moody  Clinic Number: [id number]    Therapy Diagnosis:   Encounter Diagnosis   Name Primary?    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  He was compliant with home exercise program.  Response to previous treatment: first treatment after evaluation   Functional change: none     Pain: 7/10  Location: left back  and upper legs     OBJECTIVE     Objective Measures updated at progress report unless specified.     Treatment     PAMELLA received the treatments listed below:      therapeutic exercises to develop strength and core stabilization for 30 minutes including:  Slant board 4 x 15 seconds  Hamstring stretch 4 x 15 seconds  Ball rolls 5 x 5 seconds  Cybex back extension 2 x 10 4#  Lower trunk rotation 2 x 10     manual therapy techniques: Joint mobilizations, Manual traction, and Myofacial release were applied to the: lumbar for 10 minutes, including:  Piriformis stretch 4 x 15 seconds  Sktc 4 x 15 seconds    neuromuscular re-education activities to improve: Balance, Coordination, and Posture for - minutes. The following activities were included:      supervised modalities after being cleared for contradictions: TENS:  Pamella received TENS electrical stimulation for pain to the lower back.  Pt received continuous mode for - minutes. Pamella tolerated treatment well without any adverse effects.     Mechanical Traction:  Pamella received intermittent mechanical traction to the lumbar spine at a force of 85 pounds for a total of 15 minutes. Hold time of 60 seconds and rest time for 20 seconds. Patient tolerated treatment well without any adverse effects.        Patient Education and Home Exercises     Home Exercises Provided and Patient Education Provided     Education provided:   - encouraged to continue home exercise program     Written Home Exercises Provided: Patient instructed to cont prior HEP. Exercises were reviewed and PAMELLA was able to demonstrate them prior to the end of the session.  PAMELLA demonstrated good  understanding of the education provided. See EMR under Patient Instructions for exercises provided during therapy sessions    ASSESSMENT     Patient was informed on correct body mechanics to perform exercises correctly. Patient had discomfort with most exercises but was able to push through them. Patient states that MRI showed herniated disk and that he is scheduled for an injection next week. Ended session with traction for radicular pain in left leg.    PAMELLA Is progressing towards his goals.   Pt prognosis is Good.     Pt will continue to benefit from skilled outpatient physical therapy to address the deficits listed in the problem list box on initial evaluation, provide pt/family education and to maximize pt's level of independence in the home and community environment.     Pt's spiritual, cultural and educational needs considered and pt agreeable to plan of care and goals.     Anticipated barriers to physical therapy: none     Goals:   SHORT TERM GOALS: 3 weeks,  Progress   Recent signs and systems trend is improving in order to progress towards LTG's.     Patient will be independent with HEP in order to further progress and return to maximal function.     Pain rating at Worst: 5/10  in order to progress towards increased independence with activity.     Patient will be able to correct postural deviations in sitting and standing, to decrease pain and promote postural awareness for injury prevention.         LONG TERM GOALS: 6 weeks,  Progress   Patient will return to normal ADL, recreational, and work related activities with less pain and limitation.      Patient will improve AROM to stated goals in order to return to maximal functional potential.      Patient will improve Strength to stated goals of appropriate musculature in order to improve functional independence.      Pain Rating at Best: 1/10 to improve Quality of Life.      Patient will meet predicted functional outcome (FOTO) score:  points to increase self-worth & perceived functional ability.        PLAN     Plan of care Certification: 3/22/2023 to 5/3/2023     Outpatient Physical Therapy 1-2 times weekly for 6 weeks to include any combination of the following interventions: virtual visits, dry needling, modalities, electrical stimulation (IFC, Pre-Mod, Attended with Functional Dry Needling), Cervical/Lumbar Traction, Electrical Stimulation  , Gait Training, Manual Therapy, Moist Heat/ Ice, Neuromuscular Re-ed, Patient Education, Self Care, Therapeutic Activities, Therapeutic Exercise, Ultrasound, Functional Training, and Therapeutic Activites        Bhavna Triplett PTA   04/12/2023

## 2023-04-26 ENCOUNTER — CLINICAL SUPPORT (OUTPATIENT)
Dept: REHABILITATION | Facility: HOSPITAL | Age: 43
End: 2023-04-26

## 2023-04-26 DIAGNOSIS — M54.41 CHRONIC BILATERAL LOW BACK PAIN WITH BILATERAL SCIATICA: Primary | ICD-10-CM

## 2023-04-26 DIAGNOSIS — G89.29 CHRONIC BILATERAL LOW BACK PAIN WITH BILATERAL SCIATICA: Primary | ICD-10-CM

## 2023-04-26 DIAGNOSIS — M54.42 CHRONIC BILATERAL LOW BACK PAIN WITH BILATERAL SCIATICA: Primary | ICD-10-CM

## 2023-04-26 PROCEDURE — 97110 THERAPEUTIC EXERCISES: CPT

## 2023-04-26 PROCEDURE — 97012 MECHANICAL TRACTION THERAPY: CPT

## 2023-04-26 NOTE — PROGRESS NOTES
[facility]   Physical Therapy Treatment Note     Name: Pamella Moody  Clinic Number: [id number]    Therapy Diagnosis:   Encounter Diagnosis   Name Primary?    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  He was compliant with home exercise program.  Response to previous treatment: first treatment after evaluation   Functional change: none     Pain: 7/10  Location: left back  and upper legs     OBJECTIVE     Objective Measures updated at progress report unless specified.     Treatment     PAMELLA received the treatments listed below:      therapeutic exercises to develop strength and core stabilization for 30 minutes including:  Slant board 4 x 15 seconds  Hamstring stretch 4 x 15 seconds  Ball rolls 5 x 5 seconds  Cybex back extension 2 x 10 4#  Lower trunk rotation 2 x 10     manual therapy techniques: Joint mobilizations, Manual traction, and Myofacial release were applied to the: lumbar for 10 minutes, including:  Piriformis stretch 4 x 15 seconds  Sktc 4 x 15 seconds    neuromuscular re-education activities to improve: Balance, Coordination, and Posture for - minutes. The following activities were included:      supervised modalities after being cleared for contradictions: TENS:  Pamella received TENS electrical stimulation for pain to the lower back.  Pt received continuous mode for - minutes. Pamella tolerated treatment well without any adverse effects.     Mechanical Traction:  Pamella received intermittent mechanical traction to the lumbar spine at a force of 85 pounds for a total of 15 minutes. Hold time of 60 seconds and rest time for 20 seconds. Patient tolerated treatment well without any adverse effects.        Patient Education and Home Exercises     Home Exercises Provided and Patient Education Provided     Education provided:   - encouraged to continue home exercise program     Written Home Exercises Provided: Patient instructed to cont prior HEP. Exercises were reviewed and PAMELLA was able to demonstrate them prior to the end of the session.  PAMELLA demonstrated good  understanding of the education provided. See EMR under Patient Instructions for exercises provided during therapy sessions    ASSESSMENT     Patient was informed on correct body mechanics to perform exercises correctly. Patient had discomfort with most exercises but was able to push through them. Patient states that MRI showed herniated disk and that he is scheduled for an injection next week. Ended session with traction for radicular pain in left leg.    PAMELLA Is progressing towards his goals.   Pt prognosis is Good.     Pt will continue to benefit from skilled outpatient physical therapy to address the deficits listed in the problem list box on initial evaluation, provide pt/family education and to maximize pt's level of independence in the home and community environment.     Pt's spiritual, cultural and educational needs considered and pt agreeable to plan of care and goals.     Anticipated barriers to physical therapy: none     Goals:   SHORT TERM GOALS: 3 weeks,  Progress   Recent signs and systems trend is improving in order to progress towards LTG's.     Patient will be independent with HEP in order to further progress and return to maximal function.     Pain rating at Worst: 5/10  in order to progress towards increased independence with activity.     Patient will be able to correct postural deviations in sitting and standing, to decrease pain and promote postural awareness for injury prevention.         LONG TERM GOALS: 6 weeks,  Progress   Patient will return to normal ADL, recreational, and work related activities with less pain and limitation.      Patient will improve AROM to stated goals in order to return to maximal functional potential.      Patient will improve Strength to stated goals of appropriate musculature in order to improve functional independence.      Pain Rating at Best: 1/10 to improve Quality of Life.      Patient will meet predicted functional outcome (FOTO) score:  points to increase self-worth & perceived functional ability.        PLAN     Plan of care Certification: 3/22/2023 to 5/3/2023     Outpatient Physical Therapy 1-2 times weekly for 6 weeks to include any combination of the following interventions: virtual visits, dry needling, modalities, electrical stimulation (IFC, Pre-Mod, Attended with Functional Dry Needling), Cervical/Lumbar Traction, Electrical Stimulation  , Gait Training, Manual Therapy, Moist Heat/ Ice, Neuromuscular Re-ed, Patient Education, Self Care, Therapeutic Activities, Therapeutic Exercise, Ultrasound, Functional Training, and Therapeutic Activites        Bhavna Triplett, KEELEY   04/26/2023

## 2023-04-26 NOTE — PROGRESS NOTES
[facility]   Physical Therapy Treatment Note     Name: Pamella Moody  Clinic Number: [id number]    Therapy Diagnosis:   Encounter Diagnosis   Name Primary?    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   He was compliant with home exercise program.  Response to previous treatment: first treatment after evaluation   Functional change: none     Pain: 7/10  Location: left back  and upper legs     OBJECTIVE     Objective Measures updated at progress report unless specified.     Treatment     PAMELLA received the treatments listed below:      therapeutic exercises to develop strength and core stabilization for 30 minutes including:  Slant board 4 x 15 seconds  Hamstring stretch 4 x 15 seconds  Ball rolls forward and lateral  5 x 5 seconds  Cybex back extension 2 x 10 4#  Lower trunk rotation 2 x 10     manual therapy techniques: Joint mobilizations, Manual traction, and Myofacial release were applied to the: lumbar for  minutes, including:  Piriformis stretch 4 x 15 seconds  Sktc 4 x 15 seconds    neuromuscular re-education activities to improve: Balance, Coordination, and Posture for - minutes. The following activities were included:      supervised modalities after being cleared for  contradictions: TENS:  Pamella received TENS electrical stimulation for pain to the lower back. Pt received continuous mode for - minutes. Pamella tolerated treatment well without any adverse effects.     Mechanical Traction:  Pamella received intermittent mechanical traction to the lumbar spine at a force of 85 pounds for a total of 15 minutes. Hold time of 60 seconds and rest time for 20 seconds. Patient tolerated treatment well without any adverse effects.        Patient Education and Home Exercises     Home Exercises Provided and Patient Education Provided     Education provided:   - encouraged to continue home exercise program     Written Home Exercises Provided: Patient instructed to cont prior HEP. Exercises were reviewed and PAMELLA was able to demonstrate them prior to the end of the session.  PAMELLA demonstrated good  understanding of the education provided. See EMR under Patient Instructions for exercises provided during therapy sessions    ASSESSMENT     Patient was informed on correct body mechanics to perform exercises correctly. Patient had discomfort with most exercises but was able to push through them. Patient states that MRI showed herniated disk and that he is scheduled for an injection next week. Ended session with traction for radicular pain in left leg.    PAMELLA Is progressing towards his goals.   Pt prognosis is Good.     Pt will continue to benefit from skilled outpatient physical therapy to address the deficits listed in the problem list box on initial evaluation, provide pt/family education and to maximize pt's level of independence in the home and community environment.     Pt's spiritual, cultural and educational needs considered and pt agreeable to plan of care and goals.     Anticipated barriers to physical therapy: none     Goals:   SHORT TERM GOALS: 3 weeks,  Progress   Recent signs and systems trend is improving in order to progress towards LTG's.     Patient will be independent with  HEP in order to further progress and return to maximal function.     Pain rating at Worst: 5/10 in order to progress towards increased independence with activity.     Patient will be able to correct postural deviations in sitting and standing, to decrease pain and promote postural awareness for injury prevention.         LONG TERM GOALS: 6 weeks,  Progress   Patient will return to normal ADL, recreational, and work related activities with less pain and limitation.      Patient will improve AROM to stated goals in order to return to maximal functional potential.      Patient will improve Strength to stated goals of appropriate musculature in order to improve functional independence.      Pain Rating at Best: 1/10 to improve Quality of Life.      Patient will meet predicted functional outcome (FOTO) score:  points to increase self-worth & perceived functional ability.        PLAN     Plan of care Certification: 3/22/2023 to 5/3/2023     Outpatient Physical Therapy 1-2 times weekly for 6 weeks to include any combination of the following interventions: virtual visits, dry needling, modalities, electrical stimulation (IFC, Pre-Mod, Attended with Functional Dry Needling), Cervical/Lumbar Traction, Electrical Stimulation  , Gait Training, Manual Therapy, Moist Heat/ Ice, Neuromuscular Re-ed, Patient Education, Self Care, Therapeutic Activities, Therapeutic Exercise, Ultrasound, Functional Training, and Therapeutic Activites        Debra Robison, PT   04/26/2023

## 2023-05-03 ENCOUNTER — CLINICAL SUPPORT (OUTPATIENT)
Dept: REHABILITATION | Facility: HOSPITAL | Age: 43
End: 2023-05-03

## 2023-05-03 DIAGNOSIS — G89.29 CHRONIC BILATERAL LOW BACK PAIN WITH BILATERAL SCIATICA: Primary | ICD-10-CM

## 2023-05-03 DIAGNOSIS — M54.41 CHRONIC BILATERAL LOW BACK PAIN WITH BILATERAL SCIATICA: Primary | ICD-10-CM

## 2023-05-03 DIAGNOSIS — M54.42 CHRONIC BILATERAL LOW BACK PAIN WITH BILATERAL SCIATICA: Primary | ICD-10-CM

## 2023-05-03 PROCEDURE — 97110 THERAPEUTIC EXERCISES: CPT | Mod: CQ

## 2023-05-03 PROCEDURE — 97140 MANUAL THERAPY 1/> REGIONS: CPT | Mod: CQ

## 2023-05-03 NOTE — PROGRESS NOTES
[facility]   Physical Therapy Treatment Note     Name: Pamella Moody  Clinic Number: [id number]    Therapy Diagnosis:   Encounter Diagnosis   Name Primary?    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  He was compliant with home exercise program.  Response to previous treatment: first treatment after evaluation   Functional change: none     Pain: 8/10  Location: left back  and upper legs     OBJECTIVE     Objective Measures updated at progress report unless specified.     Treatment     PAMELLA received the treatments listed below:      therapeutic exercises to develop strength and core stabilization for 40 minutes including:  Slant board 4 x 15 seconds  Hamstring stretch 4 x 15 seconds  Ball rolls forward and lateral  5 x 5 seconds  Cybex back extension 2 x 10 4#  Lower trunk rotation 2 x 10     manual therapy techniques: Joint mobilizations, Manual traction, and Myofacial release were applied to the: lumbar for 15 minutes, including:  Piriformis stretch 4 x 15 seconds  Sktc 4 x 15 seconds  Positional distraction     neuromuscular re-education activities to improve: Balance, Coordination, and Posture for - minutes. The following activities were included:      supervised modalities after being cleared for contradictions: TENS:  Pamella  received TENS electrical stimulation for pain to the lower back. Pt received continuous mode for - minutes. Pamella tolerated treatment well without any adverse effects.     Mechanical Traction:  Pamella received intermittent mechanical traction to the lumbar spine at a force of 85 pounds for a total of 0 minutes. Hold time of 60 seconds and rest time for 20 seconds. Patient tolerated treatment well without any adverse effects.      Patient Education and Home Exercises     Home Exercises Provided and Patient Education Provided     Education provided:   - encouraged to continue home exercise program     Written Home Exercises Provided: Patient instructed to cont prior HEP. Exercises were reviewed and PAMELLA was able to demonstrate them prior to the end of the session.  PAMELLA demonstrated good  understanding of the education provided. See EMR under Patient Instructions for exercises provided during therapy sessions    ASSESSMENT     Patient was informed on correct body mechanics to perform exercises correctly. Patient had discomfort with most exercises but was able to push through them.  Ended session with positional distraction to lumbar. Patient states that the positional distraction seem to help.    PAMELLA Is progressing towards his goals.   Pt prognosis is Good.     Pt will continue to benefit from skilled outpatient physical therapy to address the deficits listed in the problem list box on initial evaluation, provide pt/family education and to maximize pt's level of independence in the home and community environment.     Pt's spiritual, cultural and educational needs considered and pt agreeable to plan of care and goals.     Anticipated barriers to physical therapy: none     Goals:   SHORT TERM GOALS: 3 weeks,  Progress   Recent signs and systems trend is improving in order to progress towards LTG's.     Patient will be independent with HEP in order to further progress and return to maximal function.     Pain  rating at Worst: 5/10 in order to progress towards increased independence with activity.     Patient will be able to correct postural deviations in sitting and standing, to decrease pain and promote postural awareness for injury prevention.         LONG TERM GOALS: 6 weeks,  Progress   Patient will return to normal ADL, recreational, and work related activities with less pain and limitation.      Patient will improve AROM to stated goals in order to return to maximal functional potential.      Patient will improve Strength to stated goals of appropriate musculature in order to improve functional independence.      Pain Rating at Best: 1/10 to improve Quality of Life.      Patient will meet predicted functional outcome (FOTO) score:  points to increase self-worth & perceived functional ability.        PLAN     Plan of care Certification: 3/22/2023 to 5/3/2023     Outpatient Physical Therapy 1-2 times weekly for 6 weeks to include any combination of the following interventions: virtual visits, dry needling, modalities, electrical stimulation (IFC, Pre-Mod, Attended with Functional Dry Needling), Cervical/Lumbar Traction, Electrical Stimulation  , Gait Training, Manual Therapy, Moist Heat/ Ice, Neuromuscular Re-ed, Patient Education, Self Care, Therapeutic Activities, Therapeutic Exercise, Ultrasound, Functional Training, and Therapeutic Activites        Bhavna Triplett PTA   05/03/2023

## 2023-05-05 NOTE — PLAN OF CARE
[facility]   Physical Therapy Updated Plan of Care      Name: Pamella Moody  Clinic Number: [id number]     Therapy Diagnosis:        Encounter Diagnosis   Name Primary?    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]  Evaluation Date: 3/22/2023  Authorization Period Expiration: 3/5/24  Plan of Care Expiration: 7/26/2023                              Visit # / Visits authorized: 6/20               FOTO: Visit #1 - Scored : 1 / 3      PRECAUTIONS: Standard Precautions         PTA Visit #: 1/5       SUBJECTIVE    See Bhavna Triplett PTA daily note for today's subjective information     OBJECTIVE       See Bhavna Triplett PTA daily note for today's objective information   Positional distraction       ASSESSMENT   Patient continues to have symptoms and was given positional distraction during today's session after consulting with Bhavna Triplett PTA. The distraction did seem to ease symptoms. Patient has not had goals met at this time and will continue to benefit from skilled physical therapy at this time to address deficits.      PAMELLA Is progressing towards his goals.   Pt prognosis is Good.      Pt will continue to benefit from skilled outpatient physical therapy to address the deficits listed in the problem list box on initial evaluation, provide pt/family education and to maximize pt's level of independence in the home and community environment.      Pt's spiritual, cultural and educational needs considered and pt agreeable to plan of care and goals.     Anticipated barriers to physical therapy: none      Goals Not Met    Goals:   SHORT TERM GOALS: 3 weeks,  Progress   Recent signs and systems trend is improving in order to progress towards  LTG's.     Patient will be independent with HEP in order to further progress and return to maximal function.     Pain rating at Worst: 5/10 in order to progress towards increased independence with activity.     Patient will be able to correct postural deviations in sitting and standing, to decrease pain and promote postural awareness for injury prevention.         LONG TERM GOALS: 6 weeks,  Progress   Patient will return to normal ADL, recreational, and work related activities with less pain and limitation.      Patient will improve AROM to stated goals in order to return to maximal functional potential.      Patient will improve Strength to stated goals of appropriate musculature in order to improve functional independence.      Pain Rating at Best: 1/10 to improve Quality of Life.      Patient will meet predicted functional outcome (FOTO) score:  points to increase self-worth & perceived functional ability.           PLAN      Reasons for Recertification of Therapy: To continue to progress toward goals     Plan     Updated Certification Period: 5/3/23 to 7/26/23  Recommended Treatment Plan: 2 times per week for 12 weeks: Aquatic Therapy, Cervical/Lumbar Traction, Electrical Stimulation IFC/Premod, Gait Training, Iontophoresis (with Dex), Manual Therapy, Moist Heat/ Ice, Neuromuscular Re-ed, Patient Education, Self Care, Therapeutic Activities, Therapeutic Exercise, and Ultrasound  Other Recommendations: none    ROMEO BRANDT, PT  5/5/2023      I CERTIFY THE NEED FOR THESE SERVICES FURNISHED UNDER THIS PLAN OF TREATMENT AND WHILE UNDER MY CARE.    Physician's comments:      Physician's Signature: ___________________________________________________

## 2023-05-08 ENCOUNTER — CLINICAL SUPPORT (OUTPATIENT)
Dept: REHABILITATION | Facility: HOSPITAL | Age: 43
End: 2023-05-08

## 2023-05-08 DIAGNOSIS — M54.42 CHRONIC BILATERAL LOW BACK PAIN WITH BILATERAL SCIATICA: Primary | ICD-10-CM

## 2023-05-08 DIAGNOSIS — G89.29 CHRONIC BILATERAL LOW BACK PAIN WITH BILATERAL SCIATICA: Primary | ICD-10-CM

## 2023-05-08 DIAGNOSIS — M54.41 CHRONIC BILATERAL LOW BACK PAIN WITH BILATERAL SCIATICA: Primary | ICD-10-CM

## 2023-05-08 PROCEDURE — 97112 NEUROMUSCULAR REEDUCATION: CPT | Mod: CQ

## 2023-05-08 PROCEDURE — 97110 THERAPEUTIC EXERCISES: CPT | Mod: CQ

## 2023-05-08 NOTE — PROGRESS NOTES
[facility]   Physical Therapy Treatment Note     Name: Pamella Moody  Clinic Number: [id number]    Therapy Diagnosis:   Encounter Diagnosis   Name Primary?    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 I think the positional distraction is helping.  He was compliant with home exercise program.  Response to previous treatment: first treatment after evaluation   Functional change: none     Pain: 1/10  Location: left back  and upper legs     OBJECTIVE     Objective Measures updated at progress report unless specified.     Treatment     PAMELLA received the treatments listed below:      therapeutic exercises to develop strength and core stabilization for 30 minutes including:  Slant board 4 x 15 seconds  Hamstring stretch 4 x 15 seconds  Ball rolls forward and lateral  5 x 5 seconds  Cybex back extension 2 x 10 4#  Lower trunk rotation 2 x 10     manual therapy techniques: Joint mobilizations, Manual traction, and Myofacial release were applied to the: lumbar for 10 minutes, including:  Piriformis stretch 4 x 15 seconds  Sktc 4 x 15 seconds  Positional distraction     neuromuscular re-education activities to improve: Balance, Coordination, and Posture for - minutes. The following activities were included:      supervised modalities after being  cleared for contradictions: TENS:  Pamella received TENS electrical stimulation for pain to the lower back. Pt received continuous mode for - minutes. Pamella tolerated treatment well without any adverse effects.     Mechanical Traction:  Pamella received intermittent mechanical traction to the lumbar spine at a force of 85 pounds for a total of 0 minutes. Hold time of 60 seconds and rest time for 20 seconds. Patient tolerated treatment well without any adverse effects.      Patient Education and Home Exercises     Home Exercises Provided and Patient Education Provided     Education provided:   - encouraged to continue home exercise program     Written Home Exercises Provided: Patient instructed to cont prior HEP. Exercises were reviewed and PAMELLA was able to demonstrate them prior to the end of the session.  PAMELLA demonstrated good  understanding of the education provided. See EMR under Patient Instructions for exercises provided during therapy sessions    ASSESSMENT     Patient was informed on correct body mechanics to perform exercises correctly. Patient had discomfort with most exercises but was able to push through them.  Patient states that positional distraction seemed to be helping and his pain was a 1/10 today.    PAMELLA Is progressing towards his goals.   Pt prognosis is Good.     Pt will continue to benefit from skilled outpatient physical therapy to address the deficits listed in the problem list box on initial evaluation, provide pt/family education and to maximize pt's level of independence in the home and community environment.     Pt's spiritual, cultural and educational needs considered and pt agreeable to plan of care and goals.     Anticipated barriers to physical therapy: none     Goals:   SHORT TERM GOALS: 3 weeks,  Progress   Recent signs and systems trend is improving in order to progress towards LTG's.     Patient will be independent with HEP in order to further progress and return to maximal  function.     Pain rating at Worst: 5/10 in order to progress towards increased independence with activity.     Patient will be able to correct postural deviations in sitting and standing, to decrease pain and promote postural awareness for injury prevention.         LONG TERM GOALS: 6 weeks,  Progress   Patient will return to normal ADL, recreational, and work related activities with less pain and limitation.      Patient will improve AROM to stated goals in order to return to maximal functional potential.      Patient will improve Strength to stated goals of appropriate musculature in order to improve functional independence.      Pain Rating at Best: 1/10 to improve Quality of Life.      Patient will meet predicted functional outcome (FOTO) score:  points to increase self-worth & perceived functional ability.        PLAN     Plan of care Certification: 3/22/2023 to 7/\26/2023     Outpatient Physical Therapy 1-2 times weekly for 6 weeks to include any combination of the following interventions: virtual visits, dry needling, modalities, electrical stimulation (IFC, Pre-Mod, Attended with Functional Dry Needling), Cervical/Lumbar Traction, Electrical Stimulation  , Gait Training, Manual Therapy, Moist Heat/ Ice, Neuromuscular Re-ed, Patient Education, Self Care, Therapeutic Activities, Therapeutic Exercise, Ultrasound, Functional Training, and Therapeutic Activites        Bhavna Triplett, KEELEY   05/08/2023

## 2023-05-10 ENCOUNTER — CLINICAL SUPPORT (OUTPATIENT)
Dept: REHABILITATION | Facility: HOSPITAL | Age: 43
End: 2023-05-10

## 2023-05-10 DIAGNOSIS — M54.42 CHRONIC BILATERAL LOW BACK PAIN WITH BILATERAL SCIATICA: Primary | ICD-10-CM

## 2023-05-10 DIAGNOSIS — G89.29 CHRONIC BILATERAL LOW BACK PAIN WITH BILATERAL SCIATICA: Primary | ICD-10-CM

## 2023-05-10 DIAGNOSIS — M54.41 CHRONIC BILATERAL LOW BACK PAIN WITH BILATERAL SCIATICA: Primary | ICD-10-CM

## 2023-05-10 PROCEDURE — 97110 THERAPEUTIC EXERCISES: CPT | Mod: CQ

## 2023-05-10 PROCEDURE — 97140 MANUAL THERAPY 1/> REGIONS: CPT | Mod: CQ

## 2023-05-10 NOTE — PROGRESS NOTES
[facility]   Physical Therapy Treatment Note     Name: Pamella Moody  Clinic Number: [id number]    Therapy Diagnosis:   Encounter Diagnosis   Name Primary?    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  He was compliant with home exercise program.  Response to previous treatment: first treatment after evaluation   Functional change: none     Pain: 2/10  Location: left back  and upper legs     OBJECTIVE     Objective Measures updated at progress report unless specified.     Treatment     PAMELLA received the treatments listed below:      therapeutic exercises to develop strength and core stabilization for 30 minutes including:  Slant board 4 x 15 seconds  Hamstring stretch 4 x 15 seconds  Ball rolls forward and lateral  5 x 5 seconds  Cybex back extension 2 x 10 4#  Lower trunk rotation 2 x 10   Cybex abduction 2 x 10 2#    manual therapy techniques: Joint mobilizations, Manual traction, and Myofacial release were applied to the: lumbar for 10 minutes, including:  Piriformis stretch 4 x 15 seconds  Sktc 4 x 15 seconds  Positional distraction     neuromuscular re-education activities to improve: Balance, Coordination, and Posture for - minutes. The following activities were included:      supervised modalities after being cleared for  contradictions: TENS:  Pamella received TENS electrical stimulation for pain to the lower back. Pt received continuous mode for - minutes. Pamella tolerated treatment well without any adverse effects.     Mechanical Traction:  Pamella received intermittent mechanical traction to the lumbar spine at a force of 85 pounds for a total of 0 minutes. Hold time of 60 seconds and rest time for 20 seconds. Patient tolerated treatment well without any adverse effects.      Patient Education and Home Exercises     Home Exercises Provided and Patient Education Provided     Education provided:   - encouraged to continue home exercise program     Written Home Exercises Provided: Patient instructed to cont prior HEP. Exercises were reviewed and PAMELLA was able to demonstrate them prior to the end of the session.  PAMELLA demonstrated good  understanding of the education provided. See EMR under Patient Instructions for exercises provided during therapy sessions    ASSESSMENT     Patient was informed on correct body mechanics to perform exercises correctly. Patient had discomfort with most exercises but was able to push through them.  Patient states that positional distraction seemed to be helping and his pain was a 2/10 today.    PAMELLA Is progressing towards his goals.   Pt prognosis is Good.     Pt will continue to benefit from skilled outpatient physical therapy to address the deficits listed in the problem list box on initial evaluation, provide pt/family education and to maximize pt's level of independence in the home and community environment.     Pt's spiritual, cultural and educational needs considered and pt agreeable to plan of care and goals.     Anticipated barriers to physical therapy: none     Goals:   SHORT TERM GOALS: 3 weeks,  Progress   Recent signs and systems trend is improving in order to progress towards LTG's.     Patient will be independent with HEP in order to further progress and return to maximal function.      Pain rating at Worst: 5/10 in order to progress towards increased independence with activity.     Patient will be able to correct postural deviations in sitting and standing, to decrease pain and promote postural awareness for injury prevention.         LONG TERM GOALS: 6 weeks,  Progress   Patient will return to normal ADL, recreational, and work related activities with less pain and limitation.      Patient will improve AROM to stated goals in order to return to maximal functional potential.      Patient will improve Strength to stated goals of appropriate musculature in order to improve functional independence.      Pain Rating at Best: 1/10 to improve Quality of Life.      Patient will meet predicted functional outcome (FOTO) score:  points to increase self-worth & perceived functional ability.        PLAN     Plan of care Certification: 3/22/2023 to 7/\26/2023     Outpatient Physical Therapy 1-2 times weekly for 6 weeks to include any combination of the following interventions: virtual visits, dry needling, modalities, electrical stimulation (IFC, Pre-Mod, Attended with Functional Dry Needling), Cervical/Lumbar Traction, Electrical Stimulation  , Gait Training, Manual Therapy, Moist Heat/ Ice, Neuromuscular Re-ed, Patient Education, Self Care, Therapeutic Activities, Therapeutic Exercise, Ultrasound, Functional Training, and Therapeutic Activites        Bhavna Triplett, KEELEY   05/10/2023

## 2023-05-15 ENCOUNTER — CLINICAL SUPPORT (OUTPATIENT)
Dept: REHABILITATION | Facility: HOSPITAL | Age: 43
End: 2023-05-15

## 2023-05-15 DIAGNOSIS — M54.42 CHRONIC BILATERAL LOW BACK PAIN WITH BILATERAL SCIATICA: Primary | ICD-10-CM

## 2023-05-15 DIAGNOSIS — G89.29 CHRONIC BILATERAL LOW BACK PAIN WITH BILATERAL SCIATICA: Primary | ICD-10-CM

## 2023-05-15 DIAGNOSIS — M54.41 CHRONIC BILATERAL LOW BACK PAIN WITH BILATERAL SCIATICA: Primary | ICD-10-CM

## 2023-05-15 PROCEDURE — 97110 THERAPEUTIC EXERCISES: CPT | Mod: CQ

## 2023-05-15 PROCEDURE — 97112 NEUROMUSCULAR REEDUCATION: CPT | Mod: CQ

## 2023-05-15 NOTE — PROGRESS NOTES
[facility]   Physical Therapy Treatment Note     Name: Pamella Moody  Clinic Number: [id number]    Therapy Diagnosis:   Encounter Diagnosis   Name Primary?    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  He was compliant with home exercise program.  Response to previous treatment: first treatment after evaluation   Functional change: none     Pain: 6/10  Location: left back  and upper legs     OBJECTIVE     Objective Measures updated at progress report unless specified.     Treatment     PAMELLA received the treatments listed below:      therapeutic exercises to develop strength and core stabilization for 30 minutes including:  Slant board 4 x 15 seconds  Hamstring stretch 4 x 15 seconds  Ball rolls forward and lateral  5 x 5 seconds  Cybex back extension 2 x 10 4#  Lower trunk rotation 2 x 10   Cybex abduction 2 x 10 2#    manual therapy techniques: Joint mobilizations, Manual traction, and Myofacial release were applied to the: lumbar for 15 minutes, including:  Piriformis stretch 4 x 15 seconds  Sktc 4 x 15 seconds  Positional distraction     neuromuscular re-education activities to improve: Balance, Coordination, and Posture for - minutes. The following activities were included:      supervised modalities after being cleared for  contradictions: TENS:  Pamella received TENS electrical stimulation for pain to the lower back. Pt received continuous mode for - minutes. Pamella tolerated treatment well without any adverse effects.     Mechanical Traction:  Pamella received intermittent mechanical traction to the lumbar spine at a force of 85 pounds for a total of 0 minutes. Hold time of 60 seconds and rest time for 20 seconds. Patient tolerated treatment well without any adverse effects.      Patient Education and Home Exercises     Home Exercises Provided and Patient Education Provided     Education provided:   - encouraged to continue home exercise program     Written Home Exercises Provided: Patient instructed to cont prior HEP. Exercises were reviewed and PAMELLA was able to demonstrate them prior to the end of the session.  PAMELLA demonstrated good  understanding of the education provided. See EMR under Patient Instructions for exercises provided during therapy sessions    ASSESSMENT     Patient was informed on correct body mechanics to perform exercises correctly. Patient had discomfort with most exercises but was able to push through them.  Patient states that he did a lot over the weekend and that he was in pain.  PAMELLA Is progressing towards his goals.   Pt prognosis is Good.     Pt will continue to benefit from skilled outpatient physical therapy to address the deficits listed in the problem list box on initial evaluation, provide pt/family education and to maximize pt's level of independence in the home and community environment.     Pt's spiritual, cultural and educational needs considered and pt agreeable to plan of care and goals.     Anticipated barriers to physical therapy: none     Goals:   SHORT TERM GOALS: 3 weeks,  Progress   Recent signs and systems trend is improving in order to progress towards LTG's.     Patient will be independent with HEP in order to further progress and return to maximal function.     Pain rating at  Worst: 5/10 in order to progress towards increased independence with activity.     Patient will be able to correct postural deviations in sitting and standing, to decrease pain and promote postural awareness for injury prevention.         LONG TERM GOALS: 6 weeks,  Progress   Patient will return to normal ADL, recreational, and work related activities with less pain and limitation.      Patient will improve AROM to stated goals in order to return to maximal functional potential.      Patient will improve Strength to stated goals of appropriate musculature in order to improve functional independence.      Pain Rating at Best: 1/10 to improve Quality of Life.      Patient will meet predicted functional outcome (FOTO) score:  points to increase self-worth & perceived functional ability.        PLAN     Plan of care Certification: 3/22/2023 to 7/\26/2023     Outpatient Physical Therapy 1-2 times weekly for 6 weeks to include any combination of the following interventions: virtual visits, dry needling, modalities, electrical stimulation (IFC, Pre-Mod, Attended with Functional Dry Needling), Cervical/Lumbar Traction, Electrical Stimulation  , Gait Training, Manual Therapy, Moist Heat/ Ice, Neuromuscular Re-ed, Patient Education, Self Care, Therapeutic Activities, Therapeutic Exercise, Ultrasound, Functional Training, and Therapeutic Activites        Bhavna Triplett PTA   05/15/2023

## 2023-05-17 ENCOUNTER — CLINICAL SUPPORT (OUTPATIENT)
Dept: REHABILITATION | Facility: HOSPITAL | Age: 43
End: 2023-05-17

## 2023-05-17 DIAGNOSIS — M54.42 CHRONIC BILATERAL LOW BACK PAIN WITH BILATERAL SCIATICA: Primary | ICD-10-CM

## 2023-05-17 DIAGNOSIS — G89.29 CHRONIC BILATERAL LOW BACK PAIN WITH BILATERAL SCIATICA: Primary | ICD-10-CM

## 2023-05-17 DIAGNOSIS — M54.41 CHRONIC BILATERAL LOW BACK PAIN WITH BILATERAL SCIATICA: Primary | ICD-10-CM

## 2023-05-17 PROCEDURE — 97140 MANUAL THERAPY 1/> REGIONS: CPT | Mod: CQ

## 2023-05-17 PROCEDURE — 97112 NEUROMUSCULAR REEDUCATION: CPT | Mod: CQ

## 2023-05-17 PROCEDURE — 97110 THERAPEUTIC EXERCISES: CPT | Mod: CQ

## 2023-05-17 NOTE — PROGRESS NOTES
[facility]   Physical Therapy Treatment Note     Name: Pamella Moody  Clinic Number: [id number]    Therapy Diagnosis:   Encounter Diagnosis   Name Primary?    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 He had an injection yesterday and he was able to almost lay flat last night.  He was compliant with home exercise program.  Response to previous treatment: first treatment after evaluation   Functional change: none     Pain: 2/10  Location: left back  and upper legs     OBJECTIVE     Objective Measures updated at progress report unless specified.     Treatment     PAMELLA received the treatments listed below:      therapeutic exercises to develop strength and core stabilization for 20 minutes including:  Slant board 4 x 15 seconds  Hamstring stretch 4 x 15 seconds  Ball rolls forward and lateral  5 x 5 seconds  Cybex back extension 2 x 10 4#  Lower trunk rotation 2 x 10   Cybex abduction 2 x 10 2#    manual therapy techniques: Joint mobilizations, Manual traction, and Myofacial release were applied to the: lumbar for 10 minutes, including:  Piriformis stretch 4 x 15 seconds  Sktc 4 x 15 seconds  Positional distraction     neuromuscular re-education activities to improve: Balance, Coordination, and Posture for 10 minutes. The following activities  were included:  Prone on elbow 5 x 5 seconds  Prone on hands 5 x 5 seconds    supervised modalities after being cleared for contradictions: TENS:  Pamella received TENS electrical stimulation for pain to the lower back. Pt received continuous mode for - minutes. Pamella tolerated treatment well without any adverse effects.     Mechanical Traction:  Pamella received intermittent mechanical traction to the lumbar spine at a force of 85 pounds for a total of 0 minutes. Hold time of 60 seconds and rest time for 20 seconds. Patient tolerated treatment well without any adverse effects.      Patient Education and Home Exercises     Home Exercises Provided and Patient Education Provided     Education provided:   - encouraged to continue home exercise program     Written Home Exercises Provided: Patient instructed to cont prior HEP. Exercises were reviewed and PAMELLA was able to demonstrate them prior to the end of the session.  PAMELLA demonstrated good  understanding of the education provided. See EMR under Patient Instructions for exercises provided during therapy sessions    ASSESSMENT     Patient was informed on correct body mechanics to perform exercises correctly. Patient had discomfort with most exercises but was able to push through them.  Added prone extension exercises today. Patient states that he think the injection may have helped.  PAMELLA Is progressing towards his goals.   Pt prognosis is Good.     Pt will continue to benefit from skilled outpatient physical therapy to address the deficits listed in the problem list box on initial evaluation, provide pt/family education and to maximize pt's level of independence in the home and community environment.     Pt's spiritual, cultural and educational needs considered and pt agreeable to plan of care and goals.     Anticipated barriers to physical therapy: none     Goals:   SHORT TERM GOALS: 3 weeks,  Progress   Recent signs and systems trend is improving in order to  progress towards LTG's.     Patient will be independent with HEP in order to further progress and return to maximal function.     Pain rating at Worst: 5/10 in order to progress towards increased independence with activity.     Patient will be able to correct postural deviations in sitting and standing, to decrease pain and promote postural awareness for injury prevention.         LONG TERM GOALS: 6 weeks,  Progress   Patient will return to normal ADL, recreational, and work related activities with less pain and limitation.      Patient will improve AROM to stated goals in order to return to maximal functional potential.      Patient will improve Strength to stated goals of appropriate musculature in order to improve functional independence.      Pain Rating at Best: 1/10 to improve Quality of Life.      Patient will meet predicted functional outcome (FOTO) score:  points to increase self-worth & perceived functional ability.        PLAN     Plan of care Certification: 3/22/2023 to 7/\26/2023     Outpatient Physical Therapy 1-2 times weekly for 6 weeks to include any combination of the following interventions: virtual visits, dry needling, modalities, electrical stimulation (IFC, Pre-Mod, Attended with Functional Dry Needling), Cervical/Lumbar Traction, Electrical Stimulation  , Gait Training, Manual Therapy, Moist Heat/ Ice, Neuromuscular Re-ed, Patient Education, Self Care, Therapeutic Activities, Therapeutic Exercise, Ultrasound, Functional Training, and Therapeutic Activites        Bhavna Triplett, KEELEY   05/17/2023

## 2023-05-22 ENCOUNTER — CLINICAL SUPPORT (OUTPATIENT)
Dept: REHABILITATION | Facility: HOSPITAL | Age: 43
End: 2023-05-22

## 2023-05-22 DIAGNOSIS — M54.42 CHRONIC BILATERAL LOW BACK PAIN WITH BILATERAL SCIATICA: Primary | ICD-10-CM

## 2023-05-22 DIAGNOSIS — G89.29 CHRONIC BILATERAL LOW BACK PAIN WITH BILATERAL SCIATICA: Primary | ICD-10-CM

## 2023-05-22 DIAGNOSIS — M54.41 CHRONIC BILATERAL LOW BACK PAIN WITH BILATERAL SCIATICA: Primary | ICD-10-CM

## 2023-05-22 PROCEDURE — 97140 MANUAL THERAPY 1/> REGIONS: CPT | Mod: CQ

## 2023-05-22 PROCEDURE — 97110 THERAPEUTIC EXERCISES: CPT

## 2023-05-22 NOTE — PROGRESS NOTES
[facility]   Physical Therapy Treatment Note     Name: Pamella Moody  Clinic Number: [id number]    Therapy Diagnosis:   Encounter Diagnosis   Name Primary?    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  He was compliant with home exercise program.  Response to previous treatment: first treatment after evaluation   Functional change: none     Pain: 0/10  Location: left back  and upper legs     OBJECTIVE     Objective Measures updated at progress report unless specified.     Treatment     PAMELLA received the treatments listed below:      therapeutic exercises to develop strength and core stabilization for 20 minutes including:  Slant board 4 x 15 seconds  Hamstring stretch 4 x 15 seconds  Ball rolls forward and lateral  5 x 5 seconds  Cybex back extension 2 x 10 4#  Lower trunk rotation 2 x 10   Cybex abduction 2 x 10 2#    manual therapy techniques: Joint mobilizations, Manual traction, and Myofacial release were applied to the: lumbar for 10 minutes, including:  Piriformis stretch 4 x 15 seconds  Sktc 4 x 15 seconds  Positional distraction     neuromuscular re-education activities to improve: Balance, Coordination, and Posture for 0 minutes. The following activities were included:  Prone on elbow 5 x 5  seconds  Prone on hands 5 x 5 seconds    supervised modalities after being cleared for contradictions: TENS:  Pamella received TENS electrical stimulation for pain to the lower back. Pt received continuous mode for - minutes. Pamella tolerated treatment well without any adverse effects.     Mechanical Traction:  Pamella received intermittent mechanical traction to the lumbar spine at a force of 85 pounds for a total of 0 minutes. Hold time of 60 seconds and rest time for 20 seconds. Patient tolerated treatment well without any adverse effects.      Patient Education and Home Exercises     Home Exercises Provided and Patient Education Provided     Education provided:   - encouraged to continue home exercise program     Written Home Exercises Provided: Patient instructed to cont prior HEP. Exercises were reviewed and PAMELLA was able to demonstrate them prior to the end of the session.  PAMELLA demonstrated good  understanding of the education provided. See EMR under Patient Instructions for exercises provided during therapy sessions    ASSESSMENT     Patient was informed on correct body mechanics to perform exercises correctly. Patient had discomfort with some exercises but was able to push through them. PT did visit with patient today. Patient states that he think the injection may have helped.    PAMELLA Is progressing towards his goals.   Pt prognosis is Good.     Pt will continue to benefit from skilled outpatient physical therapy to address the deficits listed in the problem list box on initial evaluation, provide pt/family education and to maximize pt's level of independence in the home and community environment.     Pt's spiritual, cultural and educational needs considered and pt agreeable to plan of care and goals.     Anticipated barriers to physical therapy: none     Goals:   SHORT TERM GOALS: 3 weeks,  Progress   Recent signs and systems trend is improving in order to progress towards LTG's.     Patient will be  independent with HEP in order to further progress and return to maximal function.     Pain rating at Worst: 5/10 in order to progress towards increased independence with activity.     Patient will be able to correct postural deviations in sitting and standing, to decrease pain and promote postural awareness for injury prevention.         LONG TERM GOALS: 6 weeks,  Progress   Patient will return to normal ADL, recreational, and work related activities with less pain and limitation.      Patient will improve AROM to stated goals in order to return to maximal functional potential.      Patient will improve Strength to stated goals of appropriate musculature in order to improve functional independence.      Pain Rating at Best: 1/10 to improve Quality of Life.      Patient will meet predicted functional outcome (FOTO) score:  points to increase self-worth & perceived functional ability.        PLAN     Plan of care Certification: 3/22/2023 to 7/\26/2023     Outpatient Physical Therapy 1-2 times weekly for 6 weeks to include any combination of the following interventions: virtual visits, dry needling, modalities, electrical stimulation (IFC, Pre-Mod, Attended with Functional Dry Needling), Cervical/Lumbar Traction, Electrical Stimulation  , Gait Training, Manual Therapy, Moist Heat/ Ice, Neuromuscular Re-ed, Patient Education, Self Care, Therapeutic Activities, Therapeutic Exercise, Ultrasound, Functional Training, and Therapeutic Activites        Bhavna Triplett PTA   05/22/2023

## 2023-05-22 NOTE — PROGRESS NOTES
[facility]   Physical Therapy Treatment Note      Name: Pamella Moody  Clinic Number: [id number]     Therapy Diagnosis:        Encounter Diagnosis   Name Primary?    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  Response to previous treatment: first treatment after evaluation   Functional change: none      Pain: 0/10  Location: left back  and upper legs      OBJECTIVE      Objective Measures updated at progress report unless specified.      Treatment      PAMELLA received the treatments listed below:       therapeutic exercises to develop strength and core stabilization for 25 minutes including:    Therapist initiated an updated Home Exercise Program today. All exercises were reviewed with the patient and therapist had him return demonstration  Copy of Home Exercise Program included Patient instructions section of this note.     Slant board 4 x 15 seconds  Hamstring stretch 4 x 15 seconds  Ball rolls forward and lateral  5 x 5 seconds  Cybex back extension 2 x 10 4#  Lower trunk rotation 2 x 10   Cybex abduction 2 x 10 2#     manual therapy techniques: Joint mobilizations, Manual traction, and Myofacial release were applied to the: lumbar for 0 minutes,  including:  Piriformis stretch 4 x 15 seconds  Sktc 4 x 15 seconds  Positional distraction      neuromuscular re-education activities to improve: Balance, Coordination, and Posture for 0 minutes. The following activities were included:  Prone on elbow 5 x 5 seconds  Prone on hands 5 x 5 seconds     supervised modalities after being cleared for contradictions: TENS:  Pamella received TENS electrical stimulation for pain to the lower back. Pt received continuous mode for - minutes. Pamella tolerated treatment well without any adverse effects.      Mechanical Traction:  Pamella received intermittent mechanical traction to the lumbar spine at a force of 85 pounds for a total of 0 minutes. Hold time of 60 seconds and rest time for 20 seconds. Patient tolerated treatment well without any adverse effects.        Patient Education and Home Exercises      Home Exercises Provided and Patient Education Provided      Education provided:   - encouraged to continue home exercise program      Written Home Exercises Provided: Patient instructed to cont prior HEP. Exercises were reviewed and PAMELLA was able to demonstrate them prior to the end of the session.  PAMELLA demonstrated good  understanding of the education provided. See EMR under Patient Instructions for exercises provided during therapy sessions     ASSESSMENT      Patient was seen by the Therapist today. Therapist reviewed his Plan of Care and updated his Home Exercise Program. He was given an advanced back program that he can perform at home in order to continue to strengthen his abdominal and paraspinal muscles. Therapist instructed patient on proper form for all exercises and had him return demonstration. Written copy of Home Exercise Program was given to the patient and this was included in the patient instructions section. Sup Visit performed today with ADDIS Small.  All goals and treatment plan reviewed. Will work toward completion of all new goals set.          PAMELLA Is progressing towards his goals.   Pt prognosis is Good.      Pt will continue to benefit from skilled outpatient physical therapy to address the deficits listed in the problem list box on initial evaluation, provide pt/family education and to maximize pt's level of independence in the home and community environment.      Pt's spiritual, cultural and educational needs considered and pt agreeable to plan of care and goals.     Anticipated barriers to physical therapy: none      Goals:   SHORT TERM GOALS: 3 weeks,  Progress   Recent signs and systems trend is improving in order to progress towards LTG's.     Patient will be independent with HEP in order to further progress and return to maximal function.     Pain rating at Worst: 5/10 in order to progress towards increased independence with activity.     Patient will be able to correct postural deviations in sitting and standing, to decrease pain and promote postural awareness for injury prevention.         LONG TERM GOALS: 6 weeks,  Progress   Patient will return to normal ADL, recreational, and work related activities with less pain and limitation.      Patient will improve AROM to stated goals in order to return to maximal functional potential.      Patient will improve Strength to stated goals of appropriate musculature in order to improve functional independence.      Pain Rating at Best: 1/10 to improve Quality of Life.      Patient will meet predicted functional outcome (FOTO) score:  points to increase self-worth & perceived functional ability.           PLAN      Plan of care Certification: 5/22/2023 to 7/26/2023     Outpatient Physical Therapy 2 times weekly for 8 weeks to include any combination of the following interventions: virtual visits, dry needling, modalities, electrical stimulation (IFC, Pre-Mod, Attended with Functional Dry Needling), Cervical/Lumbar Traction, Electrical Stimulation  , Gait Training, Manual Therapy, Moist Heat/  Ice, Neuromuscular Re-ed, Patient Education, Self Care, Therapeutic Activities, Therapeutic Exercise, Ultrasound, Functional Training, and Therapeutic Activites         Fara Dhaliwal, PT, DPT  05/22/2023

## 2023-05-25 ENCOUNTER — CLINICAL SUPPORT (OUTPATIENT)
Dept: REHABILITATION | Facility: HOSPITAL | Age: 43
End: 2023-05-25

## 2023-05-25 DIAGNOSIS — M54.41 CHRONIC BILATERAL LOW BACK PAIN WITH BILATERAL SCIATICA: Primary | ICD-10-CM

## 2023-05-25 DIAGNOSIS — G89.29 CHRONIC BILATERAL LOW BACK PAIN WITH BILATERAL SCIATICA: Primary | ICD-10-CM

## 2023-05-25 DIAGNOSIS — M54.42 CHRONIC BILATERAL LOW BACK PAIN WITH BILATERAL SCIATICA: Primary | ICD-10-CM

## 2023-05-25 PROCEDURE — 97110 THERAPEUTIC EXERCISES: CPT

## 2023-05-25 PROCEDURE — 97530 THERAPEUTIC ACTIVITIES: CPT

## 2023-05-25 PROCEDURE — 97112 NEUROMUSCULAR REEDUCATION: CPT

## 2023-05-25 NOTE — PROGRESS NOTES
[facility]   Physical Therapy Treatment Note      Name: Pamella Moody  Clinic Number: [id number]     Therapy Diagnosis:        Encounter Diagnosis   Name Primary?    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 The am getting out of bed is still the worst as a dull ache. The weather does affect it but moving helps. Getting the 3rd injection on 6/14.     He was compliant with home exercise program.  Response to previous treatment: first treatment after evaluation   Functional change: none      Pain: 0/10  Location: left back  and upper legs      OBJECTIVE      Bilateral sitting slump test - negative      Treatment      PAMELLA received the treatments listed below:       Therapeutic Activities x 10 min  Sled push/pull x 5  Box lift & carry x 10 ft 40lb x 5    therapeutic exercises to develop strength and core stabilization for 25 minutes including:  Slant board 4 x 15 seconds  Hamstring stretch 4 x 15 seconds  Ball rolls forward and lateral  5 x 5 seconds  Piriformis stretch bilateral sitting  Elliptical x 5 min    neuromuscular re-education activities to improve: Balance, Coordination, and Posture for 8 minutes. The following activities were included:  Cybex back extension 2 x 10 4#  Cybex abduction 2 x 10 4#              (Not today)  Lower  trunk rotation 2 x 10 (not today)  Therapist initiated an updated Home Exercise Program today. All exercises were reviewed with the patient and therapist had him return demonstration  Copy of Home Exercise Program included Patient instructions section of this note.   manual therapy techniques: Joint mobilizations, Manual traction, and Myofacial release were applied to the: lumbar for 0 minutes, including:  Sktc 4 x 15 seconds  Positional distraction   Prone on elbow 5 x 5 seconds  Prone on hands 5 x 5 seconds  supervised modalities after being cleared for contradictions: TENS:  Pamella received TENS electrical stimulation for pain to the lower back. Pt received continuous mode for - minutes. Pamella tolerated treatment well without any adverse effects.   Mechanical Traction:  Pamella received intermittent mechanical traction to the lumbar spine at a force of 85 pounds for a total of 0 minutes. Hold time of 60 seconds and rest time for 20 seconds. Patient tolerated treatment well without any adverse effects.        Patient Education and Home Exercises      Home Exercises Provided and Patient Education Provided      Education provided:   - encouraged to continue home exercise program      Written Home Exercises Provided: Patient instructed to cont prior HEP. Exercises were reviewed and PAMELLA was able to demonstrate them prior to the end of the session.  PAMELLA demonstrated good  understanding of the education provided. See EMR under Patient Instructions for exercises provided during therapy sessions     ASSESSMENT      Negative sitting slump test bilaterally. 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 Patient was given exercises that are work simulation to prepare the body for return to work. Patient did fatigue but did not have increased pain. Patient has 2 more sessions then prepare for D/C.      Patient was seen by the Therapist today. Therapist reviewed  his Plan of Care and updated his Home Exercise Program. He was given an advanced back program that he can perform at home in order to continue to strengthen his abdominal and paraspinal muscles. Therapist instructed patient on proper form for all exercises and had him return demonstration. Written copy of Home Exercise Program was given to the patient and this was included in the patient instructions section. Sup Visit performed today with ADDIS Small.  All goals and treatment plan reviewed. Will work toward completion of all new goals set.         PAMELLA Is progressing towards his goals.   Pt prognosis is Good.      Pt will continue to benefit from skilled outpatient physical therapy to address the deficits listed in the problem list box on initial evaluation, provide pt/family education and to maximize pt's level of independence in the home and community environment.      Pt's spiritual, cultural and educational needs considered and pt agreeable to plan of care and goals.     Anticipated barriers to physical therapy: none      Goals:   SHORT TERM GOALS: 3 weeks,  Progress   Recent signs and systems trend is improving in order to progress towards LTG's.     Patient will be independent with HEP in order to further progress and return to maximal function.     Pain rating at Worst: 5/10 in order to progress towards increased independence with activity.     Patient will be able to correct postural deviations in sitting and standing, to decrease pain and promote postural awareness for injury prevention.         LONG TERM GOALS: 6 weeks,  Progress   Patient will return to normal ADL, recreational, and work related activities with less pain and limitation.      Patient will improve AROM to stated goals in order to return to maximal functional potential.      Patient will improve Strength to stated goals of appropriate musculature in order to improve functional independence.      Pain Rating at Best: 1/10 to  improve Quality of Life.      Patient will meet predicted functional outcome (FOTO) score:  points to increase self-worth & perceived functional ability.           PLAN      Plan of care Certification: 5/22/2023 to 7/26/2023     Outpatient Physical Therapy 2 times weekly for 8 weeks to include any combination of the following interventions: virtual visits, dry needling, modalities, electrical stimulation (IFC, Pre-Mod, Attended with Functional Dry Needling), Cervical/Lumbar Traction, Electrical Stimulation  , Gait Training, Manual Therapy, Moist Heat/ Ice, Neuromuscular Re-ed, Patient Education, Self Care, Therapeutic Activities, Therapeutic Exercise, Ultrasound, Functional Training, and Therapeutic Activites         Fara Dhaliwal, PT, DPT  05/22/2023

## 2023-05-30 ENCOUNTER — CLINICAL SUPPORT (OUTPATIENT)
Dept: REHABILITATION | Facility: HOSPITAL | Age: 43
End: 2023-05-30

## 2023-05-30 DIAGNOSIS — M54.41 CHRONIC BILATERAL LOW BACK PAIN WITH BILATERAL SCIATICA: Primary | ICD-10-CM

## 2023-05-30 DIAGNOSIS — M54.42 CHRONIC BILATERAL LOW BACK PAIN WITH BILATERAL SCIATICA: Primary | ICD-10-CM

## 2023-05-30 DIAGNOSIS — G89.29 CHRONIC BILATERAL LOW BACK PAIN WITH BILATERAL SCIATICA: Primary | ICD-10-CM

## 2023-05-30 PROCEDURE — 97112 NEUROMUSCULAR REEDUCATION: CPT | Mod: CQ

## 2023-05-30 PROCEDURE — 97110 THERAPEUTIC EXERCISES: CPT | Mod: CQ

## 2023-05-30 PROCEDURE — 97530 THERAPEUTIC ACTIVITIES: CPT | Mod: CQ

## 2023-05-30 NOTE — PROGRESS NOTES
[facility]   Physical Therapy Treatment Note      Name: Pamella Moody  Clinic Number: [id number]     Therapy Diagnosis:        Encounter Diagnosis   Name Primary?    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 The am getting out of bed is still the worst as a dull ache. The weather does affect it but moving helps. Getting the 3rd injection on 6/14.     He was compliant with home exercise program.  Response to previous treatment: first treatment after evaluation   Functional change: none      Pain: 0/10  Location: left back  and upper legs      OBJECTIVE      Bilateral sitting slump test - negative      Treatment      PAMELLA received the treatments listed below:       Therapeutic Activities x 10 min  Sled push/pull x 5  Box lift & carry x 10 ft 40lb x 5    therapeutic exercises to develop strength and core stabilization for 25 minutes including:  Slant board 4 x 15 seconds  Hamstring stretch 4 x 15 seconds  Ball rolls forward and lateral  5 x 5 seconds  Piriformis stretch bilateral sitting  Elliptical x 5 min    neuromuscular re-education activities to improve: Balance, Coordination, and Posture for 8 minutes. The following activities were included:  Cybex back extension 2 x 10 5#  Cybex abduction 2 x 10 4#            (Not today)  Lower trunk  rotation 2 x 10 (not today)  Therapist initiated an updated Home Exercise Program today. All exercises were reviewed with the patient and therapist had him return demonstration  Copy of Home Exercise Program included Patient instructions section of this note.   manual therapy techniques: Joint mobilizations, Manual traction, and Myofacial release were applied to the: lumbar for 0 minutes, including:  Sktc 4 x 15 seconds  Positional distraction   Prone on elbow 5 x 5 seconds  Prone on hands 5 x 5 seconds  supervised modalities after being cleared for contradictions: TENS:  Pamella received TENS electrical stimulation for pain to the lower back. Pt received continuous mode for - minutes. Pamella tolerated treatment well without any adverse effects.   Mechanical Traction:  Pamella received intermittent mechanical traction to the lumbar spine at a force of 85 pounds for a total of 0 minutes. Hold time of 60 seconds and rest time for 20 seconds. Patient tolerated treatment well without any adverse effects.        Patient Education and Home Exercises      Home Exercises Provided and Patient Education Provided      Education provided:   - encouraged to continue home exercise program      Written Home Exercises Provided: Patient instructed to cont prior HEP. Exercises were reviewed and PAMELLA was able to demonstrate them prior to the end of the session.  PAMELLA demonstrated good  understanding of the education provided. See EMR under Patient Instructions for exercises provided during therapy sessions     ASSESSMENT      Patient was given exercises that are work simulation to prepare the body for return to work. Patient did fatigue but did not have increased pain. Patient has 1 more sessions then prepare for D/C.      Patient was seen by the Therapist today. Therapist reviewed his Plan of Care and updated his Home Exercise Program. He was given an advanced back program that he can perform at home in order to continue to  strengthen his abdominal and paraspinal muscles. Therapist instructed patient on proper form for all exercises and had him return demonstration. Written copy of Home Exercise Program was given to the patient and this was included in the patient instructions section. Sup Visit performed today with ADDIS Small.  All goals and treatment plan reviewed. Will work toward completion of all new goals set.         PAMELLA Is progressing towards his goals.   Pt prognosis is Good.      Pt will continue to benefit from skilled outpatient physical therapy to address the deficits listed in the problem list box on initial evaluation, provide pt/family education and to maximize pt's level of independence in the home and community environment.      Pt's spiritual, cultural and educational needs considered and pt agreeable to plan of care and goals.     Anticipated barriers to physical therapy: none      Goals:   SHORT TERM GOALS: 3 weeks,  Progress   Recent signs and systems trend is improving in order to progress towards LTG's.     Patient will be independent with HEP in order to further progress and return to maximal function.     Pain rating at Worst: 5/10 in order to progress towards increased independence with activity.     Patient will be able to correct postural deviations in sitting and standing, to decrease pain and promote postural awareness for injury prevention.         LONG TERM GOALS: 6 weeks,  Progress   Patient will return to normal ADL, recreational, and work related activities with less pain and limitation.      Patient will improve AROM to stated goals in order to return to maximal functional potential.      Patient will improve Strength to stated goals of appropriate musculature in order to improve functional independence.      Pain Rating at Best: 1/10 to improve Quality of Life.      Patient will meet predicted functional outcome (FOTO) score:  points to increase self-worth & perceived functional  ability.           PLAN      Plan of care Certification: 5/22/2023 to 7/26/2023     Outpatient Physical Therapy 2 times weekly for 8 weeks to include any combination of the following interventions: virtual visits, dry needling, modalities, electrical stimulation (IFC, Pre-Mod, Attended with Functional Dry Needling), Cervical/Lumbar Traction, Electrical Stimulation  , Gait Training, Manual Therapy, Moist Heat/ Ice, Neuromuscular Re-ed, Patient Education, Self Care, Therapeutic Activities, Therapeutic Exercise, Ultrasound, Functional Training, and Therapeutic Activites         Bhavna Triplett, KEELEY  05/22/2023

## 2023-06-06 ENCOUNTER — CLINICAL SUPPORT (OUTPATIENT)
Dept: REHABILITATION | Facility: HOSPITAL | Age: 43
End: 2023-06-06

## 2023-06-06 DIAGNOSIS — M54.42 CHRONIC BILATERAL LOW BACK PAIN WITH BILATERAL SCIATICA: Primary | ICD-10-CM

## 2023-06-06 DIAGNOSIS — M54.41 CHRONIC BILATERAL LOW BACK PAIN WITH BILATERAL SCIATICA: Primary | ICD-10-CM

## 2023-06-06 DIAGNOSIS — G89.29 CHRONIC BILATERAL LOW BACK PAIN WITH BILATERAL SCIATICA: Primary | ICD-10-CM

## 2023-06-06 PROCEDURE — 97112 NEUROMUSCULAR REEDUCATION: CPT

## 2023-06-06 PROCEDURE — 97110 THERAPEUTIC EXERCISES: CPT

## 2023-06-06 NOTE — PROGRESS NOTES
[facility]   Physical Therapy Discharge Summary      Name: Pamella Moody  Clinic Number: [id number]     Therapy Diagnosis:        Encounter Diagnosis   Name Primary?    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 Doing well and doing exercises in gym. Stiff muscles along the outer thigh    He was compliant with home exercise program.  Response to previous treatment: first treatment after evaluation   Functional change: none      Pain: 0/10  Location: left back  and upper legs      OBJECTIVE      Bilateral sitting slump test - negative      Treatment      PAMELLA received the treatments listed below:         therapeutic exercises to develop strength and core stabilization for 20 minutes including:  Slant board 4 x 15 seconds  Hamstring stretch 4 x 15 seconds  Ball rolls forward and lateral  5 x 5 seconds  Piriformis stretch bilateral sitting      neuromuscular re-education activities to improve: Balance, Coordination, and Posture for 10 minutes. The following activities were included:  Cybex back extension 2 x 10 5#  Cybex abduction 2 x 10 4#       Patient Education and Home Exercises      Home Exercises Provided and Patient Education Provided      Education provided:   - encouraged to continue home exercise program      Written Home Exercises  Provided: Patient instructed to cont prior HEP. Exercises were reviewed and PAMELLA was able to demonstrate them prior to the end of the session.  PAMELLA demonstrated good  understanding of the education provided. See EMR under Patient Instructions for exercises provided during therapy sessions     ASSESSMENT      Patient will D/C today with home exercise program in gym. Discussed return to pull ups and push offs. All Goals Met      Patient was seen by the Therapist today. Therapist reviewed his Plan of Care and updated his Home Exercise Program. He was given an advanced back program that he can perform at home in order to continue to strengthen his abdominal and paraspinal muscles. Therapist instructed patient on proper form for all exercises and had him return demonstration. Written copy of Home Exercise Program was given to the patient and this was included in the patient instructions section. Sup Visit performed today with ADDIS Small.  All goals and treatment plan reviewed. Will work toward completion of all new goals set.         PAMELLA Is progressing towards his goals.   Pt prognosis is Good.      Pt will continue to benefit from skilled outpatient physical therapy to address the deficits listed in the problem list box on initial evaluation, provide pt/family education and to maximize pt's level of independence in the home and community environment.      Pt's spiritual, cultural and educational needs considered and pt agreeable to plan of care and goals.     Anticipated barriers to physical therapy: none        All Goals Met      Goals:   SHORT TERM GOALS: 3 weeks,  Progress   Recent signs and systems trend is improving in order to progress towards LTG's.     Patient will be independent with HEP in order to further progress and return to maximal function.     Pain rating at Worst: 5/10 in order to progress towards increased independence with activity.     Patient will be able to correct postural  deviations in sitting and standing, to decrease pain and promote postural awareness for injury prevention.         LONG TERM GOALS: 6 weeks,  Progress   Patient will return to normal ADL, recreational, and work related activities with less pain and limitation.      Patient will improve AROM to stated goals in order to return to maximal functional potential.      Patient will improve Strength to stated goals of appropriate musculature in order to improve functional independence.      Pain Rating at Best: 1/10 to improve Quality of Life.      Patient will meet predicted functional outcome (FOTO) score:  points to increase self-worth & perceived functional ability.          Discharge reason: Patient has met all of his/her goals and Patient has reached the maximum rehab potential for the present time    Plan   This patient is discharged from Physical Therapy.    Date of Last visit: 6/6/23  Total Visits Received: 14      ROMEO BRANDT, PT

## 2023-09-12 PROBLEM — M54.42 CHRONIC BILATERAL LOW BACK PAIN WITH BILATERAL SCIATICA: Status: RESOLVED | Noted: 2023-03-22 | Resolved: 2023-09-12

## 2023-09-12 PROBLEM — G89.29 CHRONIC BILATERAL LOW BACK PAIN WITH BILATERAL SCIATICA: Status: RESOLVED | Noted: 2023-03-22 | Resolved: 2023-09-12

## 2023-09-12 PROBLEM — M54.41 CHRONIC BILATERAL LOW BACK PAIN WITH BILATERAL SCIATICA: Status: RESOLVED | Noted: 2023-03-22 | Resolved: 2023-09-12
